# Patient Record
Sex: FEMALE | Race: OTHER | NOT HISPANIC OR LATINO
[De-identification: names, ages, dates, MRNs, and addresses within clinical notes are randomized per-mention and may not be internally consistent; named-entity substitution may affect disease eponyms.]

---

## 2019-01-16 PROBLEM — Z00.00 ENCOUNTER FOR PREVENTIVE HEALTH EXAMINATION: Status: ACTIVE | Noted: 2019-01-16

## 2019-01-17 ENCOUNTER — OTHER (OUTPATIENT)
Age: 64
End: 2019-01-17

## 2019-01-17 DIAGNOSIS — M25.559 PAIN IN UNSPECIFIED HIP: ICD-10-CM

## 2019-01-28 ENCOUNTER — APPOINTMENT (OUTPATIENT)
Dept: ORTHOPEDIC SURGERY | Facility: CLINIC | Age: 64
End: 2019-01-28
Payer: COMMERCIAL

## 2019-01-28 VITALS
HEART RATE: 66 BPM | HEIGHT: 65 IN | DIASTOLIC BLOOD PRESSURE: 92 MMHG | WEIGHT: 140 LBS | BODY MASS INDEX: 23.32 KG/M2 | SYSTOLIC BLOOD PRESSURE: 151 MMHG

## 2019-01-28 DIAGNOSIS — Z87.09 PERSONAL HISTORY OF OTHER DISEASES OF THE RESPIRATORY SYSTEM: ICD-10-CM

## 2019-01-28 DIAGNOSIS — Z78.9 OTHER SPECIFIED HEALTH STATUS: ICD-10-CM

## 2019-01-28 DIAGNOSIS — Z86.39 PERSONAL HISTORY OF OTHER ENDOCRINE, NUTRITIONAL AND METABOLIC DISEASE: ICD-10-CM

## 2019-01-28 DIAGNOSIS — Z82.61 FAMILY HISTORY OF ARTHRITIS: ICD-10-CM

## 2019-01-28 DIAGNOSIS — Z87.39 PERSONAL HISTORY OF OTHER DISEASES OF THE MUSCULOSKELETAL SYSTEM AND CONNECTIVE TISSUE: ICD-10-CM

## 2019-01-28 PROCEDURE — 73502 X-RAY EXAM HIP UNI 2-3 VIEWS: CPT | Mod: LT

## 2019-01-28 PROCEDURE — 99205 OFFICE O/P NEW HI 60 MIN: CPT

## 2019-01-28 RX ORDER — CELECOXIB 200 MG/1
200 CAPSULE ORAL
Refills: 0 | Status: ACTIVE | COMMUNITY

## 2019-01-28 RX ORDER — BRIMONIDINE TARTRATE 2 MG/MG
0.2 SOLUTION/ DROPS OPHTHALMIC
Refills: 0 | Status: ACTIVE | COMMUNITY

## 2019-01-28 RX ORDER — CHOLECALCIFEROL (VITAMIN D3) 125 MCG
TABLET ORAL
Refills: 0 | Status: ACTIVE | COMMUNITY

## 2019-01-28 RX ORDER — BRINZOLAMIDE 10 MG/ML
1 SUSPENSION/ DROPS OPHTHALMIC
Refills: 0 | Status: ACTIVE | COMMUNITY

## 2019-01-28 RX ORDER — SIMVASTATIN 10 MG/1
10 TABLET, FILM COATED ORAL
Refills: 0 | Status: ACTIVE | COMMUNITY

## 2019-01-28 RX ORDER — DICLOFENAC SODIUM 10 MG/G
1 GEL TOPICAL
Refills: 0 | Status: ACTIVE | COMMUNITY

## 2019-01-28 RX ORDER — UBIDECARENONE/VIT E ACET 100MG-5
CAPSULE ORAL
Refills: 0 | Status: ACTIVE | COMMUNITY

## 2019-01-28 RX ORDER — MULTIVIT-MIN/FA/LYCOPEN/LUTEIN .4-300-25
TABLET ORAL
Refills: 0 | Status: ACTIVE | COMMUNITY

## 2019-01-28 NOTE — HISTORY OF PRESENT ILLNESS
[de-identified] : The patient is a 51 year old female being seen for evaluation of her left hip. She reports progressive left hip pain over the past year. Pain is most notable in the groin. She reports constant discomfort. She has difficulty donning/doffing shoes and socks. She is unable to bend. She has decreased range of motion. She takes Celebrex as needed. She utilizes diclofenac gel. She has had an intra-articular cortisone injection in October of last year with only transient relief. She presents today for evaluation to discuss further treatment options. She does report an allergy to aspirin which develops a rash. She denies a history of diabetes, PE or DVT.

## 2019-01-28 NOTE — REVIEW OF SYSTEMS
[Joint Pain] : joint pain [Sight Problems] : vision problems [Urinary Frequency] : urinary frequency

## 2019-01-28 NOTE — DISCUSSION/SUMMARY
[de-identified] : The patient is a 51 year old female with bone on bone arthritis of the left hip. Based upon the patients continued symptoms and failure to respond to conservative treatment I have recommended a left total hip replacement for the patient. A discussion was had with the patient regarding a left total hip replacement. Anterior and posterior exposures were discussed. The patient would like to proceed with an anterior approach. A long discussion was had with the patient as what the total joint replacement would entail. A model was used to demonstrate the operation and to discuss bearing surfaces of the implants. The hospitalization and rehabilitation were discussed. The use of perioperative antibiotics and DVT prophylaxis were discussed. The risks, benefits and alternatives to surgical intervention were discussed at length with the patient. Specific risks discussed included: infection, wound breakdown, numbness and damage to nerves, tendon, muscle, arteries or other blood vessels, and the possibility of leg length discrepancy. The possibility of recurrent pain, no improvement in pain and actual worsening of the pain were also mentioned in conversation with the patient. Medical complications related to the patient's general medical health including deep vein thrombosis, pulmonary embolus, heart attack, stroke, death and other complications from anesthesia were discussed as well. The patient was told that we will take steps to minimize these risks by using sterile technique, antibiotics and DVT prophylaxis when appropriate and following the patient postoperatively in the clinic setting to monitor progress. The benefits of surgery were discussed with the patient including the potential to improve the current clinical condition through operative intervention. Alternatives to surgical intervention include continued conservative management which may yield less than optimal results in this particular patient. All questions were answered to the satisfaction of the patient.\par

## 2019-01-28 NOTE — PHYSICAL EXAM
[de-identified] : The patient appears well nourished  and in no apparent distress.  The patient is alert and oriented to person, place, and time.   Affect and mood appear normal. The head is normocephalic and atraumatic.  The eyes reveal normal sclera and extra ocular muscles are intact. The tongue is midline with no apparent lesions.  Skin shows normal turgor with no evidence of eczema or psoriasis.  No respiratory distress noted.  Sensation grossly intact.\par   [de-identified] : Exam of the left hip shows full hip extension, hip flexion of 80 degrees with pain and stiffness, external rotation of 20 degrees with pain, internal rotation of 5 degrees with pain. 5/5 motor strength bilaterally distally. Sensation intact distally.  [de-identified] : Xray- AP pelvis and 2 views left hip shows bone on bone arthritis of the left hip with large femoral subchondral cysts.

## 2019-01-28 NOTE — ADDENDUM
[FreeTextEntry1] : This note was authored by Ezekiel Gonzalez working as a medical scribe for Dr. Endy Young. The note was reviewed, edited, and revised by Dr. Endy Young whom is in agreement with the exam findings, imaging findings, and treatment plan. 01/28/2019.

## 2019-01-28 NOTE — CONSULT LETTER
[Dear  ___] : Dear  [unfilled], [Consult Letter:] : I had the pleasure of evaluating your patient, [unfilled]. [Please see my note below.] : Please see my note below. [Consult Closing:] : Thank you very much for allowing me to participate in the care of this patient.  If you have any questions, please do not hesitate to contact me. [Sincerely,] : Sincerely, [FreeTextEntry2] : Delfin Perera MD [FreeTextEntry3] : Endy Young MD\par Chief of Joint Replacement\par [facility] \par [facility]\par \par

## 2019-03-04 ENCOUNTER — OTHER (OUTPATIENT)
Age: 64
End: 2019-03-04

## 2019-03-05 ENCOUNTER — APPOINTMENT (OUTPATIENT)
Dept: ORTHOPEDIC SURGERY | Facility: CLINIC | Age: 64
End: 2019-03-05
Payer: MEDICARE

## 2019-03-05 VITALS
SYSTOLIC BLOOD PRESSURE: 132 MMHG | HEART RATE: 68 BPM | DIASTOLIC BLOOD PRESSURE: 83 MMHG | HEIGHT: 65 IN | BODY MASS INDEX: 23.32 KG/M2 | WEIGHT: 140 LBS

## 2019-03-05 DIAGNOSIS — M16.12 UNILATERAL PRIMARY OSTEOARTHRITIS, LEFT HIP: ICD-10-CM

## 2019-03-05 PROCEDURE — 73502 X-RAY EXAM HIP UNI 2-3 VIEWS: CPT

## 2019-03-05 PROCEDURE — 99214 OFFICE O/P EST MOD 30 MIN: CPT

## 2019-03-05 NOTE — DISCUSSION/SUMMARY
[de-identified] : The patient is a 63 year old female with bone on bone arthritis of the left hip. Patient is scheduled for a left anterior THR March 29.  A discussion was had with the patient regarding a left total hip replacement. A long discussion was had with the patient as what the total joint replacement would entail. A model was used to demonstrate the operation and to discuss bearing surfaces of the implants. The hospitalization and rehabilitation were discussed. The use of perioperative antibiotics and DVT prophylaxis were discussed. The risks, benefits and alternatives to surgical intervention were discussed at length with the patient. Specific risks discussed included: infection, wound breakdown, numbness and damage to nerves, tendon, muscle, arteries or other blood vessels, and the possibility of leg length discrepancy. The possibility of recurrent pain, no improvement in pain and actual worsening of the pain were also mentioned in conversation with the patient. Medical complications related to the patient's general medical health including deep vein thrombosis, pulmonary embolus, heart attack, stroke, death and other complications from anesthesia were discussed as well. The patient was told that we will take steps to minimize these risks by using sterile technique, antibiotics and DVT prophylaxis when appropriate and following the patient postoperatively in the clinic setting to monitor progress. The benefits of surgery were discussed with the patient including the potential to improve the current clinical condition through operative intervention. Alternatives to surgical intervention include continued conservative management which may yield less than optimal results in this particular patient. All questions were answered to the satisfaction of the patient.\par

## 2019-03-05 NOTE — HISTORY OF PRESENT ILLNESS
[de-identified] : The patient is a 63 year old female being seen for evaluation of her left hip.She has advanced osteoarthritis of the left hip. She reports persistent symptoms. She is tentatively scheduled to undergo a left anterior approach THR on March 29. She presents today for reevaluation and discussion of the pre-, intra-and postoperative course.

## 2019-03-05 NOTE — ADDENDUM
[FreeTextEntry1] : This note was authored by Ezekiel Gonzalez working as a medical scribe for Dr. Endy Young. The note was reviewed, edited, and revised by Dr. Endy Young whom is in agreement with the exam findings, imaging findings, and treatment plan. 03/05/2019.

## 2019-03-05 NOTE — PHYSICAL EXAM
[de-identified] : The patient appears well nourished  and in no apparent distress.  The patient is alert and oriented to person, place, and time.   Affect and mood appear normal. The head is normocephalic and atraumatic.  The eyes reveal normal sclera and extra ocular muscles are intact. The tongue is midline with no apparent lesions.  Skin shows normal turgor with no evidence of eczema or psoriasis.  No respiratory distress noted.  Sensation grossly intact.\par   [de-identified] : Exam of the left hip shows full hip extension, hip flexion of 80 degrees with pain and stiffness, external rotation of 20 degrees with pain, internal rotation of 5 degrees with pain. 5/5 motor strength bilaterally distally. Sensation intact distally.  [de-identified] : Xray- AP pelvis and 2 views left hip shows bone on bone arthritis of the left hip with large femoral head subchondral cysts.

## 2019-03-11 ENCOUNTER — OUTPATIENT (OUTPATIENT)
Dept: OUTPATIENT SERVICES | Facility: HOSPITAL | Age: 64
LOS: 1 days | End: 2019-03-11
Payer: MEDICARE

## 2019-03-11 VITALS
TEMPERATURE: 97 F | HEIGHT: 63 IN | DIASTOLIC BLOOD PRESSURE: 77 MMHG | SYSTOLIC BLOOD PRESSURE: 116 MMHG | HEART RATE: 66 BPM | RESPIRATION RATE: 16 BRPM | WEIGHT: 147.71 LBS

## 2019-03-11 DIAGNOSIS — Z13.89 ENCOUNTER FOR SCREENING FOR OTHER DISORDER: ICD-10-CM

## 2019-03-11 DIAGNOSIS — Z01.818 ENCOUNTER FOR OTHER PREPROCEDURAL EXAMINATION: ICD-10-CM

## 2019-03-11 DIAGNOSIS — Z98.890 OTHER SPECIFIED POSTPROCEDURAL STATES: Chronic | ICD-10-CM

## 2019-03-11 DIAGNOSIS — Z29.9 ENCOUNTER FOR PROPHYLACTIC MEASURES, UNSPECIFIED: ICD-10-CM

## 2019-03-11 DIAGNOSIS — M16.12 UNILATERAL PRIMARY OSTEOARTHRITIS, LEFT HIP: ICD-10-CM

## 2019-03-11 LAB
ANION GAP SERPL CALC-SCNC: 11 MMOL/L — SIGNIFICANT CHANGE UP (ref 5–17)
APTT BLD: 32 SEC — SIGNIFICANT CHANGE UP (ref 27.5–36.3)
BLD GP AB SCN SERPL QL: SIGNIFICANT CHANGE UP
BUN SERPL-MCNC: 12 MG/DL — SIGNIFICANT CHANGE UP (ref 8–20)
CALCIUM SERPL-MCNC: 9.2 MG/DL — SIGNIFICANT CHANGE UP (ref 8.6–10.2)
CHLORIDE SERPL-SCNC: 103 MMOL/L — SIGNIFICANT CHANGE UP (ref 98–107)
CO2 SERPL-SCNC: 27 MMOL/L — SIGNIFICANT CHANGE UP (ref 22–29)
CREAT SERPL-MCNC: 0.68 MG/DL — SIGNIFICANT CHANGE UP (ref 0.5–1.3)
GLUCOSE SERPL-MCNC: 107 MG/DL — SIGNIFICANT CHANGE UP (ref 70–115)
HBA1C BLD-MCNC: 5.8 % — HIGH (ref 4–5.6)
HCT VFR BLD CALC: 41.4 % — SIGNIFICANT CHANGE UP (ref 37–47)
HGB BLD-MCNC: 13.9 G/DL — SIGNIFICANT CHANGE UP (ref 12–16)
INR BLD: 0.93 RATIO — SIGNIFICANT CHANGE UP (ref 0.88–1.16)
MCHC RBC-ENTMCNC: 31 PG — SIGNIFICANT CHANGE UP (ref 27–31)
MCHC RBC-ENTMCNC: 33.6 G/DL — SIGNIFICANT CHANGE UP (ref 32–36)
MCV RBC AUTO: 92.2 FL — SIGNIFICANT CHANGE UP (ref 81–99)
MRSA PCR RESULT.: SIGNIFICANT CHANGE UP
PLATELET # BLD AUTO: 259 K/UL — SIGNIFICANT CHANGE UP (ref 150–400)
POTASSIUM SERPL-MCNC: 4.3 MMOL/L — SIGNIFICANT CHANGE UP (ref 3.5–5.3)
POTASSIUM SERPL-SCNC: 4.3 MMOL/L — SIGNIFICANT CHANGE UP (ref 3.5–5.3)
PROTHROM AB SERPL-ACNC: 10.7 SEC — SIGNIFICANT CHANGE UP (ref 10–12.9)
RBC # BLD: 4.49 M/UL — SIGNIFICANT CHANGE UP (ref 4.4–5.2)
RBC # FLD: 12.6 % — SIGNIFICANT CHANGE UP (ref 11–15.6)
S AUREUS DNA NOSE QL NAA+PROBE: SIGNIFICANT CHANGE UP
SODIUM SERPL-SCNC: 141 MMOL/L — SIGNIFICANT CHANGE UP (ref 135–145)
TYPE + AB SCN PNL BLD: SIGNIFICANT CHANGE UP
WBC # BLD: 6.5 K/UL — SIGNIFICANT CHANGE UP (ref 4.8–10.8)
WBC # FLD AUTO: 6.5 K/UL — SIGNIFICANT CHANGE UP (ref 4.8–10.8)

## 2019-03-11 PROCEDURE — 86900 BLOOD TYPING SEROLOGIC ABO: CPT

## 2019-03-11 PROCEDURE — 87640 STAPH A DNA AMP PROBE: CPT

## 2019-03-11 PROCEDURE — 83036 HEMOGLOBIN GLYCOSYLATED A1C: CPT

## 2019-03-11 PROCEDURE — 86901 BLOOD TYPING SEROLOGIC RH(D): CPT

## 2019-03-11 PROCEDURE — 87641 MR-STAPH DNA AMP PROBE: CPT

## 2019-03-11 PROCEDURE — 36415 COLL VENOUS BLD VENIPUNCTURE: CPT

## 2019-03-11 PROCEDURE — 85730 THROMBOPLASTIN TIME PARTIAL: CPT

## 2019-03-11 PROCEDURE — 85610 PROTHROMBIN TIME: CPT

## 2019-03-11 PROCEDURE — 80048 BASIC METABOLIC PNL TOTAL CA: CPT

## 2019-03-11 PROCEDURE — 93005 ELECTROCARDIOGRAM TRACING: CPT

## 2019-03-11 PROCEDURE — 85027 COMPLETE CBC AUTOMATED: CPT

## 2019-03-11 PROCEDURE — 93010 ELECTROCARDIOGRAM REPORT: CPT

## 2019-03-11 PROCEDURE — 86850 RBC ANTIBODY SCREEN: CPT

## 2019-03-11 NOTE — H&P PST ADULT - HISTORY OF PRESENT ILLNESS
This is a 63 y.o female who presents to PST today. This is a 63 y.o female who presents to PST today.  The pt reports a chronic history of left hip pain for which she has undergone PT with initial improvement, however, the pain continues to persist.   She is scheduled for a left hip replacement in the near future.

## 2019-03-11 NOTE — H&P PST ADULT - RS GEN PE MLT RESP DETAILS PC
diminished breath sounds, L/diminished breath sounds, R/respirations non-labored/normal/airway patent

## 2019-03-11 NOTE — H&P PST ADULT - ASSESSMENT
CAPRINI VTE 2.0 SCORE [CLOT updated 2019]    AGE RELATED RISK FACTORS                                                       MOBILITY RELATED FACTORS  [ ] Age 41-60 years                                            (1 Point)                    [ ] Bed rest                                                        (1 Point)  [ ] Age: 61-74 years                                           (2 Points)                  [ ] Plaster cast                                                   (2 Points)  [ ] Age= 75 years                                              (3 Points)                    [ ] Bed bound for more than 72 hours                 (2 Points)    DISEASE RELATED RISK FACTORS                                               GENDER SPECIFIC FACTORS  [ ] Edema in the lower extremities                       (1 Point)              [ ] Pregnancy                                                     (1 Point)  [ ] Varicose veins                                               (1 Point)                     [ ] Post-partum < 6 weeks                                   (1 Point)             [ ] BMI > 25 Kg/m2                                            (1 Point)                     [ ] Hormonal therapy  or oral contraception          (1 Point)                 [ ] Sepsis (in the previous month)                        (1 Point)               [ ] History of pregnancy complications                 (1 point)  [ ] Pneumonia or serious lung disease                                               [ ] Unexplained or recurrent                     (1 Point)           (in the previous month)                               (1 Point)  [ ] Abnormal pulmonary function test                     (1 Point)                 SURGERY RELATED RISK FACTORS  [ ] Acute myocardial infarction                              (1 Point)               [ ]  Section                                             (1 Point)  [ ] Congestive heart failure (in the previous month)  (1 Point)      [ ] Minor surgery                                                  (1 Point)   [ ] Inflammatory bowel disease                             (1 Point)               [ ] Arthroscopic surgery                                        (2 Points)  [ ] Central venous access                                      (2 Points)                [ ] General surgery lasting more than 45 minutes (2 points)  [ ] Malignancy- Present or previous                   (2 Points)                [ ] Elective arthroplasty                                         (5 points)    [ ] Stroke (in the previous month)                          (5 Points)                                                                                                                                                           HEMATOLOGY RELATED FACTORS                                                 TRAUMA RELATED RISK FACTORS  [ ] Prior episodes of VTE                                     (3 Points)                [ ] Fracture of the hip, pelvis, or leg                       (5 Points)  [ ] Positive family history for VTE                         (3 Points)             [ ] Acute spinal cord injury (in the previous month)  (5 Points)  [ ] Prothrombin 41601 A                                     (3 Points)               [ ] Paralysis  (less than 1 month)                             (5 Points)  [ ] Factor V Leiden                                             (3 Points)                  [ ] Multiple Trauma within 1 month                        (5 Points)  [ ] Lupus anticoagulants                                     (3 Points)                                                           [ ] Anticardiolipin antibodies                               (3 Points)                                                       [ ] High homocysteine in the blood                      (3 Points)                                             [ ] Other congenital or acquired thrombophilia      (3 Points)                                                [ ] Heparin induced thrombocytopenia                  (3 Points)                                     Total Score [          ]    OPIOID RISK TOOL    GIOVANNI EACH BOX THAT APPLIES AND ADD TOTALS AT THE END    FAMILY HISTORY OF SUBSTANCE ABUSE                 FEMALE         MALE                                                Alcohol                            [    ] 1 pt         [     ] 3pts                                               Illegal Drugs                    [    ] 2 pts       [     ] 3pts                                               Rx Drugs                          [      ]4 pts      [     ] 4 pts    PERSONAL HISTORY OF SUBSTANCE ABUSE                                                                                          Alcohol                            [     ] 3 pts       [     ] 3 pts                                               Illegal Drugs                    [     ] 4 pts       [     ] 4 pts                                               Rx Drugs                          [     ] 5 pts       [     ] 5 pts    AGE BETWEEN 16-45 YEARS                                     [     ] 1 pt         [     ] 1 pt    HISTORY OF PREADOLESCENT   SEXUAL ABUSE                                                            [     ] 3 pts        [     ] 0pts    PSYCHOLOGICAL DISEASE                     ADD, OCD, Bipolar, Schizophrenia        [     ] 2 pts        [     ] 2 pts                      Depression                                               [     ] 1 pt          [     ] 1 pt           SCORING TOTAL   (add numbers and type here)              (      )                                     A score of 3 or lower indicated LOW risk for future opioid abuse  A score of 4 to 7 indicated moderate risk for future opioid abuse  A score of 8 or higher indicates a high risk for opioid abuse CAPRINI VTE 2.0 SCORE [CLOT updated 2019]    AGE RELATED RISK FACTORS                                                       MOBILITY RELATED FACTORS  [ ] Age 41-60 years                                            (1 Point)                    [ ] Bed rest                                                        (1 Point)  [x ] Age: 61-74 years                                           (2 Points)                  [ ] Plaster cast                                                   (2 Points)  [ ] Age= 75 years                                              (3 Points)                    [ ] Bed bound for more than 72 hours                 (2 Points)    DISEASE RELATED RISK FACTORS                                               GENDER SPECIFIC FACTORS  [ ] Edema in the lower extremities                       (1 Point)              [ ] Pregnancy                                                     (1 Point)  [ ] Varicose veins                                               (1 Point)                     [ ] Post-partum < 6 weeks                                   (1 Point)             [x ] BMI > 25 Kg/m2                                            (1 Point)                     [ ] Hormonal therapy  or oral contraception          (1 Point)                 [ ] Sepsis (in the previous month)                        (1 Point)               [ ] History of pregnancy complications                 (1 point)  [ ] Pneumonia or serious lung disease                                               [ ] Unexplained or recurrent                     (1 Point)           (in the previous month)                               (1 Point)  [ ] Abnormal pulmonary function test                     (1 Point)                 SURGERY RELATED RISK FACTORS  [ ] Acute myocardial infarction                              (1 Point)               [ ]  Section                                             (1 Point)  [ ] Congestive heart failure (in the previous month)  (1 Point)      [ ] Minor surgery                                                  (1 Point)   [ ] Inflammatory bowel disease                             (1 Point)               [ ] Arthroscopic surgery                                        (2 Points)  [ ] Central venous access                                      (2 Points)                [ ] General surgery lasting more than 45 minutes (2 points)  [ ] Malignancy- Present or previous                   (2 Points)                [x ] Elective arthroplasty                                         (5 points)    [ ] Stroke (in the previous month)                          (5 Points)                                                                                                                                                           HEMATOLOGY RELATED FACTORS                                                 TRAUMA RELATED RISK FACTORS  [ ] Prior episodes of VTE                                     (3 Points)                [ ] Fracture of the hip, pelvis, or leg                       (5 Points)  [ ] Positive family history for VTE                         (3 Points)             [ ] Acute spinal cord injury (in the previous month)  (5 Points)  [ ] Prothrombin 28725 A                                     (3 Points)               [ ] Paralysis  (less than 1 month)                             (5 Points)  [ ] Factor V Leiden                                             (3 Points)                  [ ] Multiple Trauma within 1 month                        (5 Points)  [ ] Lupus anticoagulants                                     (3 Points)                                                           [ ] Anticardiolipin antibodies                               (3 Points)                                                       [ ] High homocysteine in the blood                      (3 Points)                                             [ ] Other congenital or acquired thrombophilia      (3 Points)                                                [ ] Heparin induced thrombocytopenia                  (3 Points)                                     Total Score [    8      ]    OPIOID RISK TOOL    GIOVANNI EACH BOX THAT APPLIES AND ADD TOTALS AT THE END    FAMILY HISTORY OF SUBSTANCE ABUSE                 FEMALE         MALE                                                Alcohol                            [    ] 1 pt         [     ] 3pts                                               Illegal Drugs                    [    ] 2 pts       [     ] 3pts                                               Rx Drugs                          [      ]4 pts      [     ] 4 pts    PERSONAL HISTORY OF SUBSTANCE ABUSE                                                                                          Alcohol                            [     ] 3 pts       [     ] 3 pts                                               Illegal Drugs                    [     ] 4 pts       [     ] 4 pts                                               Rx Drugs                          [     ] 5 pts       [     ] 5 pts    AGE BETWEEN 16-45 YEARS                                     [     ] 1 pt         [     ] 1 pt    HISTORY OF PREADOLESCENT   SEXUAL ABUSE                                                            [     ] 3 pts        [     ] 0pts    PSYCHOLOGICAL DISEASE                     ADD, OCD, Bipolar, Schizophrenia        [     ] 2 pts        [     ] 2 pts                      Depression                                               [     ] 1 pt          [     ] 1 pt           SCORING TOTAL   (add numbers and type here)              (  0    )                                     A score of 3 or lower indicated LOW risk for future opioid abuse  A score of 4 to 7 indicated moderate risk for future opioid abuse  A score of 8 or higher indicates a high risk for opioid abuse

## 2019-03-11 NOTE — H&P PST ADULT - NSANTHOSAYNRD_GEN_A_CORE
No. ROGERIO screening performed.  STOP BANG Legend: 0-2 = LOW Risk; 3-4 = INTERMEDIATE Risk; 5-8 = HIGH Risk

## 2019-03-11 NOTE — H&P PST ADULT - EKG AND INTERPRETATION
EKG demonstrates Sinus Bradycardia at 59bpm with PAC's noted.  PRWP V1-V3.  Pending official reading

## 2019-03-11 NOTE — H&P PST ADULT - FAMILY HISTORY
Father  Still living? No  Family history of CHF (congestive heart failure), Age at diagnosis: Age Unknown

## 2019-03-11 NOTE — PATIENT PROFILE ADULT - NSPROEDALEARNPREF_GEN_A_NUR
verbal instruction/written material/presurgical, surgical scrub instructions, pain management education given - verbalized understanding

## 2019-03-28 ENCOUNTER — TRANSCRIPTION ENCOUNTER (OUTPATIENT)
Age: 64
End: 2019-03-28

## 2019-03-29 ENCOUNTER — TRANSCRIPTION ENCOUNTER (OUTPATIENT)
Age: 64
End: 2019-03-29

## 2019-03-29 ENCOUNTER — INPATIENT (INPATIENT)
Facility: HOSPITAL | Age: 64
LOS: 2 days | Discharge: HOME | DRG: 470 | End: 2019-04-01
Attending: ORTHOPAEDIC SURGERY | Admitting: ORTHOPAEDIC SURGERY
Payer: MEDICARE

## 2019-03-29 ENCOUNTER — APPOINTMENT (OUTPATIENT)
Dept: ORTHOPEDIC SURGERY | Facility: HOSPITAL | Age: 64
End: 2019-03-29

## 2019-03-29 VITALS
HEIGHT: 65 IN | DIASTOLIC BLOOD PRESSURE: 77 MMHG | WEIGHT: 139.99 LBS | HEART RATE: 74 BPM | SYSTOLIC BLOOD PRESSURE: 118 MMHG | RESPIRATION RATE: 16 BRPM | OXYGEN SATURATION: 100 % | TEMPERATURE: 98 F

## 2019-03-29 DIAGNOSIS — M16.12 UNILATERAL PRIMARY OSTEOARTHRITIS, LEFT HIP: ICD-10-CM

## 2019-03-29 DIAGNOSIS — Z98.890 OTHER SPECIFIED POSTPROCEDURAL STATES: Chronic | ICD-10-CM

## 2019-03-29 DIAGNOSIS — E78.5 HYPERLIPIDEMIA, UNSPECIFIED: ICD-10-CM

## 2019-03-29 DIAGNOSIS — H40.9 UNSPECIFIED GLAUCOMA: ICD-10-CM

## 2019-03-29 LAB
ABO RH CONFIRMATION: SIGNIFICANT CHANGE UP
GLUCOSE BLDC GLUCOMTR-MCNC: 102 MG/DL — HIGH (ref 70–99)
GLUCOSE BLDC GLUCOMTR-MCNC: 104 MG/DL — HIGH (ref 70–99)
GLUCOSE BLDC GLUCOMTR-MCNC: 110 MG/DL — HIGH (ref 70–99)

## 2019-03-29 PROCEDURE — 27130 TOTAL HIP ARTHROPLASTY: CPT | Mod: AS,LT

## 2019-03-29 PROCEDURE — 99222 1ST HOSP IP/OBS MODERATE 55: CPT

## 2019-03-29 PROCEDURE — 12345: CPT | Mod: NC

## 2019-03-29 PROCEDURE — 93010 ELECTROCARDIOGRAM REPORT: CPT

## 2019-03-29 PROCEDURE — 27130 TOTAL HIP ARTHROPLASTY: CPT | Mod: LT

## 2019-03-29 PROCEDURE — 73502 X-RAY EXAM HIP UNI 2-3 VIEWS: CPT | Mod: 26,LT

## 2019-03-29 RX ORDER — ONDANSETRON 8 MG/1
4 TABLET, FILM COATED ORAL ONCE
Qty: 0 | Refills: 0 | Status: DISCONTINUED | OUTPATIENT
Start: 2019-03-29 | End: 2019-03-29

## 2019-03-29 RX ORDER — ONDANSETRON 8 MG/1
4 TABLET, FILM COATED ORAL EVERY 6 HOURS
Qty: 0 | Refills: 0 | Status: DISCONTINUED | OUTPATIENT
Start: 2019-03-29 | End: 2019-04-01

## 2019-03-29 RX ORDER — CELECOXIB 200 MG/1
400 CAPSULE ORAL ONCE
Qty: 0 | Refills: 0 | Status: COMPLETED | OUTPATIENT
Start: 2019-03-29 | End: 2019-03-29

## 2019-03-29 RX ORDER — VANCOMYCIN HCL 1 G
1000 VIAL (EA) INTRAVENOUS
Qty: 0 | Refills: 0 | Status: COMPLETED | OUTPATIENT
Start: 2019-03-29 | End: 2019-03-29

## 2019-03-29 RX ORDER — ACETAMINOPHEN 500 MG
1000 TABLET ORAL
Qty: 0 | Refills: 0 | Status: COMPLETED | OUTPATIENT
Start: 2019-03-29 | End: 2019-03-29

## 2019-03-29 RX ORDER — SODIUM CHLORIDE 9 MG/ML
3 INJECTION INTRAMUSCULAR; INTRAVENOUS; SUBCUTANEOUS EVERY 8 HOURS
Qty: 0 | Refills: 0 | Status: DISCONTINUED | OUTPATIENT
Start: 2019-03-29 | End: 2019-03-29

## 2019-03-29 RX ORDER — OXYCODONE HYDROCHLORIDE 5 MG/1
5 TABLET ORAL
Qty: 0 | Refills: 0 | Status: DISCONTINUED | OUTPATIENT
Start: 2019-03-29 | End: 2019-04-01

## 2019-03-29 RX ORDER — CEFAZOLIN SODIUM 1 G
2000 VIAL (EA) INJECTION ONCE
Qty: 0 | Refills: 0 | Status: DISCONTINUED | OUTPATIENT
Start: 2019-03-29 | End: 2019-03-29

## 2019-03-29 RX ORDER — ACETAMINOPHEN 500 MG
975 TABLET ORAL EVERY 8 HOURS
Qty: 0 | Refills: 0 | Status: DISCONTINUED | OUTPATIENT
Start: 2019-03-29 | End: 2019-04-01

## 2019-03-29 RX ORDER — POLYETHYLENE GLYCOL 3350 17 G/17G
17 POWDER, FOR SOLUTION ORAL DAILY
Qty: 0 | Refills: 0 | Status: DISCONTINUED | OUTPATIENT
Start: 2019-03-29 | End: 2019-04-01

## 2019-03-29 RX ORDER — SENNA PLUS 8.6 MG/1
2 TABLET ORAL AT BEDTIME
Qty: 0 | Refills: 0 | Status: DISCONTINUED | OUTPATIENT
Start: 2019-03-29 | End: 2019-04-01

## 2019-03-29 RX ORDER — SCOPALAMINE 1 MG/3D
1 PATCH, EXTENDED RELEASE TRANSDERMAL ONCE
Qty: 0 | Refills: 0 | Status: COMPLETED | OUTPATIENT
Start: 2019-03-29 | End: 2019-03-29

## 2019-03-29 RX ORDER — DOCUSATE SODIUM 100 MG
100 CAPSULE ORAL THREE TIMES A DAY
Qty: 0 | Refills: 0 | Status: DISCONTINUED | OUTPATIENT
Start: 2019-03-29 | End: 2019-04-01

## 2019-03-29 RX ORDER — ACETAMINOPHEN 500 MG
1000 TABLET ORAL ONCE
Qty: 0 | Refills: 0 | Status: DISCONTINUED | OUTPATIENT
Start: 2019-03-29 | End: 2019-03-29

## 2019-03-29 RX ORDER — FENTANYL CITRATE 50 UG/ML
50 INJECTION INTRAVENOUS
Qty: 0 | Refills: 0 | Status: DISCONTINUED | OUTPATIENT
Start: 2019-03-29 | End: 2019-03-29

## 2019-03-29 RX ORDER — KETOROLAC TROMETHAMINE 30 MG/ML
30 SYRINGE (ML) INJECTION EVERY 8 HOURS
Qty: 0 | Refills: 0 | Status: DISCONTINUED | OUTPATIENT
Start: 2019-03-29 | End: 2019-04-01

## 2019-03-29 RX ORDER — SODIUM CHLORIDE 9 MG/ML
1000 INJECTION, SOLUTION INTRAVENOUS
Qty: 0 | Refills: 0 | Status: DISCONTINUED | OUTPATIENT
Start: 2019-03-29 | End: 2019-03-29

## 2019-03-29 RX ORDER — TRANEXAMIC ACID 100 MG/ML
650 INJECTION, SOLUTION INTRAVENOUS ONCE
Qty: 0 | Refills: 0 | Status: DISCONTINUED | OUTPATIENT
Start: 2019-03-29 | End: 2019-03-29

## 2019-03-29 RX ORDER — OXYCODONE HYDROCHLORIDE 5 MG/1
10 TABLET ORAL ONCE
Qty: 0 | Refills: 0 | Status: DISCONTINUED | OUTPATIENT
Start: 2019-03-29 | End: 2019-03-29

## 2019-03-29 RX ORDER — SIMVASTATIN 20 MG/1
10 TABLET, FILM COATED ORAL AT BEDTIME
Qty: 0 | Refills: 0 | Status: DISCONTINUED | OUTPATIENT
Start: 2019-03-29 | End: 2019-04-01

## 2019-03-29 RX ORDER — HYDROMORPHONE HYDROCHLORIDE 2 MG/ML
0.5 INJECTION INTRAMUSCULAR; INTRAVENOUS; SUBCUTANEOUS EVERY 4 HOURS
Qty: 0 | Refills: 0 | Status: DISCONTINUED | OUTPATIENT
Start: 2019-03-29 | End: 2019-04-01

## 2019-03-29 RX ORDER — VANCOMYCIN HCL 1 G
1000 VIAL (EA) INTRAVENOUS ONCE
Qty: 0 | Refills: 0 | Status: COMPLETED | OUTPATIENT
Start: 2019-03-29 | End: 2019-03-29

## 2019-03-29 RX ORDER — ENOXAPARIN SODIUM 100 MG/ML
40 INJECTION SUBCUTANEOUS EVERY 24 HOURS
Qty: 0 | Refills: 0 | Status: DISCONTINUED | OUTPATIENT
Start: 2019-03-30 | End: 2019-04-01

## 2019-03-29 RX ORDER — ERGOCALCIFEROL 1.25 MG/1
1 CAPSULE ORAL
Qty: 0 | Refills: 0 | COMMUNITY

## 2019-03-29 RX ORDER — DORZOLAMIDE HYDROCHLORIDE 20 MG/ML
2 SOLUTION/ DROPS OPHTHALMIC
Qty: 0 | Refills: 0 | Status: DISCONTINUED | OUTPATIENT
Start: 2019-03-30 | End: 2019-04-01

## 2019-03-29 RX ORDER — HYDROMORPHONE HYDROCHLORIDE 2 MG/ML
2 INJECTION INTRAMUSCULAR; INTRAVENOUS; SUBCUTANEOUS
Qty: 0 | Refills: 0 | Status: DISCONTINUED | OUTPATIENT
Start: 2019-03-29 | End: 2019-04-01

## 2019-03-29 RX ORDER — ERGOCALCIFEROL 1.25 MG/1
50000 CAPSULE ORAL
Qty: 0 | Refills: 0 | Status: DISCONTINUED | OUTPATIENT
Start: 2019-03-29 | End: 2019-04-01

## 2019-03-29 RX ORDER — CELECOXIB 200 MG/1
200 CAPSULE ORAL
Qty: 0 | Refills: 0 | Status: DISCONTINUED | OUTPATIENT
Start: 2019-03-30 | End: 2019-04-01

## 2019-03-29 RX ORDER — MULTIVIT-MIN/FERROUS GLUCONATE 9 MG/15 ML
1 LIQUID (ML) ORAL
Qty: 0 | Refills: 0 | COMMUNITY

## 2019-03-29 RX ORDER — KETOROLAC TROMETHAMINE 30 MG/ML
15 SYRINGE (ML) INJECTION EVERY 6 HOURS
Qty: 0 | Refills: 0 | Status: DISCONTINUED | OUTPATIENT
Start: 2019-03-29 | End: 2019-03-30

## 2019-03-29 RX ORDER — SODIUM CHLORIDE 9 MG/ML
1000 INJECTION, SOLUTION INTRAVENOUS
Qty: 0 | Refills: 0 | Status: DISCONTINUED | OUTPATIENT
Start: 2019-03-29 | End: 2019-04-01

## 2019-03-29 RX ORDER — GABAPENTIN 400 MG/1
300 CAPSULE ORAL ONCE
Qty: 0 | Refills: 0 | Status: COMPLETED | OUTPATIENT
Start: 2019-03-29 | End: 2019-03-29

## 2019-03-29 RX ORDER — UBIDECARENONE 100 MG
0 CAPSULE ORAL
Qty: 0 | Refills: 0 | COMMUNITY

## 2019-03-29 RX ORDER — MAGNESIUM HYDROXIDE 400 MG/1
30 TABLET, CHEWABLE ORAL AT BEDTIME
Qty: 0 | Refills: 0 | Status: DISCONTINUED | OUTPATIENT
Start: 2019-03-29 | End: 2019-04-01

## 2019-03-29 RX ORDER — ACETAMINOPHEN 500 MG
650 TABLET ORAL EVERY 6 HOURS
Qty: 0 | Refills: 0 | Status: DISCONTINUED | OUTPATIENT
Start: 2019-03-29 | End: 2019-04-01

## 2019-03-29 RX ORDER — SIMVASTATIN 20 MG/1
1 TABLET, FILM COATED ORAL
Qty: 0 | Refills: 0 | COMMUNITY

## 2019-03-29 RX ORDER — BRINZOLAMIDE 10 MG/ML
2 SUSPENSION/ DROPS OPHTHALMIC
Qty: 0 | Refills: 0 | COMMUNITY

## 2019-03-29 RX ORDER — OXYCODONE HYDROCHLORIDE 5 MG/1
10 TABLET ORAL EVERY 12 HOURS
Qty: 0 | Refills: 0 | Status: DISCONTINUED | OUTPATIENT
Start: 2019-03-29 | End: 2019-04-01

## 2019-03-29 RX ORDER — MULTIVIT-MIN/FERROUS GLUCONATE 9 MG/15 ML
1 LIQUID (ML) ORAL DAILY
Qty: 0 | Refills: 0 | Status: DISCONTINUED | OUTPATIENT
Start: 2019-03-29 | End: 2019-04-01

## 2019-03-29 RX ORDER — OXYCODONE HYDROCHLORIDE 5 MG/1
10 TABLET ORAL
Qty: 0 | Refills: 0 | Status: DISCONTINUED | OUTPATIENT
Start: 2019-03-29 | End: 2019-04-01

## 2019-03-29 RX ADMIN — OXYCODONE HYDROCHLORIDE 10 MILLIGRAM(S): 5 TABLET ORAL at 22:07

## 2019-03-29 RX ADMIN — SIMVASTATIN 10 MILLIGRAM(S): 20 TABLET, FILM COATED ORAL at 22:01

## 2019-03-29 RX ADMIN — Medication 975 MILLIGRAM(S): at 22:07

## 2019-03-29 RX ADMIN — OXYCODONE HYDROCHLORIDE 10 MILLIGRAM(S): 5 TABLET ORAL at 07:40

## 2019-03-29 RX ADMIN — Medication 100 MILLIGRAM(S): at 15:02

## 2019-03-29 RX ADMIN — Medication 400 MILLIGRAM(S): at 15:57

## 2019-03-29 RX ADMIN — CELECOXIB 400 MILLIGRAM(S): 200 CAPSULE ORAL at 07:40

## 2019-03-29 RX ADMIN — OXYCODONE HYDROCHLORIDE 10 MILLIGRAM(S): 5 TABLET ORAL at 22:00

## 2019-03-29 RX ADMIN — Medication 1000 MILLIGRAM(S): at 17:00

## 2019-03-29 RX ADMIN — Medication 250 MILLIGRAM(S): at 22:01

## 2019-03-29 RX ADMIN — Medication 15 MILLIGRAM(S): at 23:36

## 2019-03-29 RX ADMIN — OXYCODONE HYDROCHLORIDE 10 MILLIGRAM(S): 5 TABLET ORAL at 07:50

## 2019-03-29 RX ADMIN — SCOPALAMINE 1 PATCH: 1 PATCH, EXTENDED RELEASE TRANSDERMAL at 19:35

## 2019-03-29 RX ADMIN — Medication 100 MILLIGRAM(S): at 22:01

## 2019-03-29 RX ADMIN — CELECOXIB 400 MILLIGRAM(S): 200 CAPSULE ORAL at 07:50

## 2019-03-29 RX ADMIN — Medication 15 MILLIGRAM(S): at 23:38

## 2019-03-29 RX ADMIN — Medication 975 MILLIGRAM(S): at 22:01

## 2019-03-29 RX ADMIN — Medication 250 MILLIGRAM(S): at 07:39

## 2019-03-29 RX ADMIN — Medication 100 MILLIGRAM(S): at 23:35

## 2019-03-29 RX ADMIN — SODIUM CHLORIDE 150 MILLILITER(S): 9 INJECTION, SOLUTION INTRAVENOUS at 22:02

## 2019-03-29 RX ADMIN — SCOPALAMINE 1 PATCH: 1 PATCH, EXTENDED RELEASE TRANSDERMAL at 07:40

## 2019-03-29 RX ADMIN — GABAPENTIN 300 MILLIGRAM(S): 400 CAPSULE ORAL at 07:40

## 2019-03-29 NOTE — DISCHARGE NOTE PROVIDER - NSDCACTIVITY_GEN_ALL_CORE
Walking - Indoors allowed/Do not make important decisions/Do not drive or operate machinery/No heavy lifting/straining

## 2019-03-29 NOTE — CHART NOTE - NSCHARTNOTEFT_GEN_A_CORE
Called by Ortho PA for persistent dizziness    patient seen and examined  chart reviewed    Pt sitting in bed with HOB elevated to 70 degrees. states when her head is resting on the pillow in that position she feels stable. if she saadia flat on bed or trying to lean forward (head off the pillow) she feels dizzy. her head spins. turning to right- no dizziness; turning to left dizziness +. no nausea/ vomits  Hemodynamically stable. 12 EKG ordered and pending  not inpain; no ear/ nasal discharge/ no URI/ no ear problems  S1S2 + rrr; CTA beatriz, PPulses +; soft abd, BS +;     pt gives h/o similar episode over 3 yrs ago occurred directly related to accupuncture. she also has hives with aspirin. with vicodin, she 'goes crazy'    pt received fentanyl prior to coming out of OR. came to PACU at 12 past and since then has been dizzy.     Recommend-    >D/C Fentanyl. Avoid all narcotics; patient on cont IVF, await for med to be flushed;     >Toradol IV Q 8 hrs PRN for mod pain ordered  pt takes celebrex at home without any reaction.  However if any cross reactivity noted, monitor and report to MD    > will not start meclizine at this time.  if persistent symptoms by AM, can then begin meclizine    will sign out to consult service

## 2019-03-29 NOTE — DISCHARGE NOTE PROVIDER - NSDCCPCAREPLAN_GEN_ALL_CORE_FT
PRINCIPAL DISCHARGE DIAGNOSIS  Diagnosis: Primary osteoarthritis of left hip  Assessment and Plan of Treatment: Pain control and physical therapy

## 2019-03-29 NOTE — DISCHARGE NOTE PROVIDER - CARE PROVIDER_API CALL
Endy Young)  Orthopaedic Surgery  [address], [facility] [address]  [place] [address]  Phone: [phone]  Fax: [phone]  Follow Up Time:

## 2019-03-29 NOTE — PHYSICAL THERAPY INITIAL EVALUATION ADULT - ADDITIONAL COMMENTS
Pt reports living in a private house with her . 4 steps to enter with rail. Independent at baseline with SAC. Has rollator and commode as well. Pt will be alone during the day.

## 2019-03-29 NOTE — BRIEF OPERATIVE NOTE - NSICDXBRIEFPROCEDURE_GEN_ALL_CORE_FT
PROCEDURES:  Total replacement of left hip joint by anterior approach 29-Mar-2019 12:16:17  Endy Young

## 2019-03-29 NOTE — DISCHARGE NOTE PROVIDER - NSDCFUADDINST_GEN_ALL_CORE_FT
The patient will be seen in the office in 2 weeks for wound check. PLEASE CONTACT OFFICE TO ARRANGE FOLLOW-UP APPOINTMENT DATE.  . Patient may shower after post-op day #5 (4/3/19). The dressing is to be removed on post op day #7 (4/5/19). IF THE DRESSING BECOMES SOILED BEFORE THE REMOVAL DATE, CHANGE WITH A SIMILAR DRESSING. IF THE DRESSING BECOMES STAINED WITH DISCHARGE, CONTACT THE OFFICE FOR FURTHER DIRECTIONS.  The patient will contact the office if the wound becomes red, has increasing pain, develops bleeding or discharge, an injury occurs, or has other concerns. The patient will continue PT consistent with anterior total hip replacement protocol. The patient will continue to practice anterior total hip precautions for a minimum of 6 week. The patient will continue Lovenox for 4 weeks for DVTP.  The patient will take Celebrex for 3 weeks for the prevention of heterotopic bone formation. The patient will take OXYCODONE AND TYLENOL for pain control and titrate according to prescription and patient needs. The patient will take Colace while taking oxycodone to prevent narcotic associated constipation.  Additionally, increase water intake (drink at least 8 glasses of water daily) and try adding fiber to the diet by eating fruits, vegetables and foods that are rich in grains. If constipation is experienced, contact the medical/primary care provider to discuss further treatment options. The patient is FULL weight bearing. The patient will be seen in the office in 2 weeks for wound check. PLEASE CONTACT OFFICE TO ARRANGE FOLLOW-UP APPOINTMENT DATE.  . Patient may shower after post-op day #5 (4/3/19). The dressing is to be removed on post op day #7 (4/5/19). IF THE DRESSING BECOMES SOILED BEFORE THE REMOVAL DATE, CHANGE WITH A SIMILAR DRESSING. IF THE DRESSING BECOMES STAINED WITH DISCHARGE, CONTACT THE OFFICE FOR FURTHER DIRECTIONS.  The patient will contact the office if the wound becomes red, has increasing pain, develops bleeding or discharge, an injury occurs, or has other concerns. The patient will continue PT consistent with anterior total hip replacement protocol. The patient will continue to practice anterior total hip precautions for a minimum of 6 week. The patient will continue Lovenox for 4 weeks for DVTP.  The patient will take Celebrex for 3 weeks for the prevention of heterotopic bone formation and pain. The patient will take TYLENOL for pain control and titrate according to prescription and patient needs. The patient is FULL weight bearing. The patient will be seen in the office in 2 weeks for wound check. PLEASE CONTACT OFFICE TO ARRANGE FOLLOW-UP APPOINTMENT DATE.  . Patient may shower after post-op day #5 (4/3/19). The dressing is to be removed on post op day #10 (4/8/19). IF THE DRESSING BECOMES SOILED BEFORE THE REMOVAL DATE, CHANGE WITH A SIMILAR DRESSING. IF THE DRESSING BECOMES STAINED WITH DISCHARGE, CONTACT THE OFFICE FOR FURTHER DIRECTIONS.  The patient will contact the office if the wound becomes red, has increasing pain, develops bleeding or discharge, an injury occurs, or has other concerns. The patient will continue PT consistent with anterior total hip replacement protocol. The patient will continue to practice anterior total hip precautions for a minimum of 6 week. The patient will continue Lovenox for 4 weeks for DVTP.  The patient will take Celebrex for 3 weeks for the prevention of heterotopic bone formation and pain. The patient will take TYLENOL for pain control and titrate according to prescription and patient needs.   Tramadol prescribed if needed.  The patient is FULL weight bearing.

## 2019-03-29 NOTE — PROGRESS NOTE ADULT - SUBJECTIVE AND OBJECTIVE BOX
ORTHO-POST-OP PROGRESS NOTE:      [id number]    ROB LINDSEY      PROCEDURE: Left Total hip arthroplasty direct anterior approach  Surgeon: Dr. Young  DOS: 3/29/19      Patient seen and examined at bedside. Sitting in bed, eating dinner. Complaining of persistent dizziness since procedure. Dr. Virgen evaluated patient and recommended Toradol and to continue to observe. If dizziness persists into tomorrow morning Meclizine will be considered. No acute motor or sensory complaints. Pain well controlled. Patient has not gotten up out of bed yet. No other complaints.       I&O's Detail    29 Mar 2019 07:01  -  29 Mar 2019 20:04  --------------------------------------------------------  IN:    lactated ringers.: 600 mL    Oral Fluid: 240 mL  Total IN: 840 mL    OUT:    Voided: 650 mL  Total OUT: 650 mL    Total NET: 190 mL        acetaminophen   Tablet .. 650 milliGRAM(s) Oral every 6 hours  acetaminophen   Tablet .. 975 milliGRAM(s) Oral every 8 hours  aluminum hydroxide/magnesium hydroxide/simethicone Suspension 30 milliLiter(s) Oral four times a day PRN  clindamycin IVPB 900 milliGRAM(s) IV Intermittent <User Schedule>  docusate sodium 100 milliGRAM(s) Oral three times a day  ergocalciferol 12199 Unit(s) Oral every week  HYDROmorphone   Tablet 2 milliGRAM(s) Oral every 3 hours PRN  HYDROmorphone  Injectable 0.5 milliGRAM(s) IV Push every 4 hours PRN  ketorolac   Injectable 30 milliGRAM(s) IV Push every 8 hours PRN  ketorolac   Injectable 15 milliGRAM(s) IV Push every 6 hours  lactated ringers. 1000 milliLiter(s) IV Continuous <Continuous>  magnesium hydroxide Suspension 30 milliLiter(s) Oral at bedtime PRN  multivitamin/minerals 1 Tablet(s) Oral daily  ondansetron Injectable 4 milliGRAM(s) IV Push every 6 hours PRN  oxyCODONE    IR 5 milliGRAM(s) Oral every 3 hours PRN  oxyCODONE    IR 10 milliGRAM(s) Oral every 3 hours PRN  oxyCODONE  ER Tablet 10 milliGRAM(s) Oral every 12 hours  polyethylene glycol 3350 17 Gram(s) Oral daily  senna 2 Tablet(s) Oral at bedtime PRN  simvastatin 10 milliGRAM(s) Oral at bedtime  vancomycin  IVPB 1000 milliGRAM(s) IV Intermittent <User Schedule>        T(C): 36.4 (03-29-19 @ 19:25), Max: 37.4 (03-29-19 @ 12:44)  HR: 68 (03-29-19 @ 19:25) (54 - 74)  BP: 110/67 (03-29-19 @ 19:25) (91/50 - 130/71)  RR: 18 (03-29-19 @ 19:25) (11 - 20)  SpO2: 96% (03-29-19 @ 19:25) (96% - 100%)  Wt(kg): --      PHYSICAL EXAM:     Constitutional: Alert, responsive, in no acute distress.     Left lower extremity: Hip dressing clean, dry, intact. No bleeding or discharge noted. Sensation to light touch grossly intact L2-S2. +plantarflexion/dorsiflexion/EHL/FHL. Calf soft NT. Dorsalis pedis pulse 2+. BCR.      < from: Xray Hip 2-3 Views, Left (03.29.19 @ 13:24) >     EXAM:  XR HIP 2-3V LT                          PROCEDURE DATE:  03/29/2019          INTERPRETATION:  HISTORY: Total hip replacement    Two views of the LEFT hip are submitted.    Evaluation demonstrates both femoral and acetabular components   well-seated and in good anatomic alignment. There is no evidence of   fracture. The visualized pelvis appears within normal limits.  Impression:  Hip replacement as described above.    < end of copied text >                                                        A/P :  69y Female S/P Left THR DAA   POD#0     -  Pain control  -  DVT ppx: Lovenox 40QD, SCDS, ankle pumps   -  Physical therapy  -  Weight bearing status: WBAT with assistance of a rolling walker  -  Postop abx: Clindamycin/vanco  -  Dispo: Home   - ORTHO-POST-OP PROGRESS NOTE:      [id number]    ROB LINDSEY      PROCEDURE: Left Total hip arthroplasty direct anterior approach  Surgeon: Dr. Young  DOS: 3/29/19      Patient seen and examined at bedside. Sitting in bed, eating dinner. Complaining of persistent dizziness since procedure. Dr. Virgen evaluated patient and recommended Toradol and to continue to observe. If dizziness persists into tomorrow morning Meclizine will be considered. No acute motor or sensory complaints. Pain well controlled. Patient has not gotten up out of bed yet. No other complaints.       I&O's Detail    29 Mar 2019 07:01  -  29 Mar 2019 20:04  --------------------------------------------------------  IN:    lactated ringers.: 600 mL    Oral Fluid: 240 mL  Total IN: 840 mL    OUT:    Voided: 650 mL  Total OUT: 650 mL    Total NET: 190 mL        acetaminophen   Tablet .. 650 milliGRAM(s) Oral every 6 hours  acetaminophen   Tablet .. 975 milliGRAM(s) Oral every 8 hours  aluminum hydroxide/magnesium hydroxide/simethicone Suspension 30 milliLiter(s) Oral four times a day PRN  clindamycin IVPB 900 milliGRAM(s) IV Intermittent <User Schedule>  docusate sodium 100 milliGRAM(s) Oral three times a day  ergocalciferol 71927 Unit(s) Oral every week  HYDROmorphone   Tablet 2 milliGRAM(s) Oral every 3 hours PRN  HYDROmorphone  Injectable 0.5 milliGRAM(s) IV Push every 4 hours PRN  ketorolac   Injectable 30 milliGRAM(s) IV Push every 8 hours PRN  ketorolac   Injectable 15 milliGRAM(s) IV Push every 6 hours  lactated ringers. 1000 milliLiter(s) IV Continuous <Continuous>  magnesium hydroxide Suspension 30 milliLiter(s) Oral at bedtime PRN  multivitamin/minerals 1 Tablet(s) Oral daily  ondansetron Injectable 4 milliGRAM(s) IV Push every 6 hours PRN  oxyCODONE    IR 5 milliGRAM(s) Oral every 3 hours PRN  oxyCODONE    IR 10 milliGRAM(s) Oral every 3 hours PRN  oxyCODONE  ER Tablet 10 milliGRAM(s) Oral every 12 hours  polyethylene glycol 3350 17 Gram(s) Oral daily  senna 2 Tablet(s) Oral at bedtime PRN  simvastatin 10 milliGRAM(s) Oral at bedtime  vancomycin  IVPB 1000 milliGRAM(s) IV Intermittent <User Schedule>        T(C): 36.4 (03-29-19 @ 19:25), Max: 37.4 (03-29-19 @ 12:44)  HR: 68 (03-29-19 @ 19:25) (54 - 74)  BP: 110/67 (03-29-19 @ 19:25) (91/50 - 130/71)  RR: 18 (03-29-19 @ 19:25) (11 - 20)  SpO2: 96% (03-29-19 @ 19:25) (96% - 100%)  Wt(kg): --      PHYSICAL EXAM:     Constitutional: Alert, responsive, in no acute distress.     Left lower extremity: Hip dressing clean, dry, intact. No bleeding or discharge noted. Sensation to light touch grossly intact L2-S2. +plantarflexion/dorsiflexion/EHL/FHL. Calf soft NT. Dorsalis pedis pulse 2+. BCR.      < from: Xray Hip 2-3 Views, Left (03.29.19 @ 13:24) >     EXAM:  XR HIP 2-3V LT                          PROCEDURE DATE:  03/29/2019          INTERPRETATION:  HISTORY: Total hip replacement    Two views of the LEFT hip are submitted.    Evaluation demonstrates both femoral and acetabular components   well-seated and in good anatomic alignment. There is no evidence of   fracture. The visualized pelvis appears within normal limits.  Impression:  Hip replacement as described above.    < end of copied text >                                                        A/P :  64 y/o Female S/P Left THR DAA   POD#0     -  Pain control  -  DVT ppx: Lovenox 40QD, SCDS, ankle pumps   -  Physical therapy  -  Weight bearing status: WBAT with assistance of a rolling walker  -  Postop abx: Clindamycin/vanco  -  Dispo: Home   -

## 2019-03-29 NOTE — PHYSICAL THERAPY INITIAL EVALUATION ADULT - ACTIVE RANGE OF MOTION EXAMINATION, REHAB EVAL
bilateral upper extremity Active ROM was WFL (within functional limits)/bilateral  lower extremity Active ROM was WFL (within functional limits)/hip precautions maintained

## 2019-03-29 NOTE — CONSULT NOTE ADULT - SUBJECTIVE AND OBJECTIVE BOX
PMD: Dr. Perera  Cardiologist: Dr. Simon    CC: Left hip pain    HPI:  64 y/o female with PMH borderline DM, seasonal asthma, glaucoma presents with chronic history of left hip pain for which she has undergone PT with initial improvement, however, the pain continues to persist.       PAST MEDICAL & SURGICAL HISTORY:  Seasonal asthma  Borderline diabetes mellitus  Glaucoma  S/P laparoscopy    FAMILY HISTORY:  Family history of CHF (congestive heart failure) (Father)    SOCIAL HISTORY:  Lives with spouse  Tobacco - Denies  ETOH - Denies  Drug Use - Denies    ALLERGIES:  PCN - Rash  ASA - Rash    HOME MEDICATIONS:  Diclofenac 1% topical gel  Brimonidine  Celebrex 200mg   Simvastatin 10mg PO qHS  Vitamin D2 50,000IU qWeek  Centrum Silver 1tab Po daily  CoQ10 daily  Azopt 1% 2gtt to each eye daily    MEDICATIONS  (STANDING):  clindamycin IVPB 900 milliGRAM(s) IV Intermittent <User Schedule>  lactated ringers. 1000 milliLiter(s) (100 mL/Hr) IV Continuous <Continuous>  vancomycin  IVPB 1000 milliGRAM(s) IV Intermittent <User Schedule>    MEDICATIONS  (PRN):  fentaNYL    Injectable 50 MICROGram(s) IV Push every 10 minutes PRN Severe Pain (7 - 10)  ondansetron Injectable 4 milliGRAM(s) IV Push once PRN Nausea and/or Vomiting      REVIEW OF SYSTEMS      General:	    Skin/Breast:  	  Ophthalmologic:  	  ENMT:	    Respiratory and Thorax:  	  Cardiovascular:	    Gastrointestinal:	    Genitourinary:	    Musculoskeletal:	    Neurological:	    Psychiatric:	    Hematology/Lymphatics:	    Endocrine:	    Allergic/Immunologic:	      Vital Signs Last 24 Hrs  T(C): 37.4 (29 Mar 2019 12:44), Max: 37.4 (29 Mar 2019 12:44)  T(F): 99.4 (29 Mar 2019 12:44), Max: 99.4 (29 Mar 2019 12:44)  HR: 58 (29 Mar 2019 14:40) (54 - 74)  BP: 115/59 (29 Mar 2019 14:40) (91/50 - 118/77)  BP(mean): --  RR: 12 (29 Mar 2019 14:40) (12 - 19)  SpO2: 97% (29 Mar 2019 14:40) (97% - 100%)    PHYSICAL EXAM:      Constitutional:    Eyes:    ENMT:    Neck:    Breasts:    Back:    Respiratory:    Cardiovascular:    Gastrointestinal:    Genitourinary:    Rectal:    Extremities:    Vascular:    Neurological:    Skin:    Lymph Nodes:    Musculoskeletal:    Psychiatric:          RADIOLOGY & ADDITIONAL STUDIES:  < from: Xray Hip 2-3 Views, Left (03.29.19 @ 13:24) >   EXAM:  XR HIP 2-3V LT                          PROCEDURE DATE:  03/29/2019          INTERPRETATION:  HISTORY: Total hip replacement    Two views of the LEFT hip are submitted.    Evaluation demonstrates both femoral and acetabular components   well-seated and in good anatomic alignment. There is no evidence of   fracture. The visualized pelvis appears within normal limits.  Impression:  Hip replacement as described above.                ROCIO JONES M.D., ATTENDING RADIOLOGIST  This document has been electronically signed. Mar 29 2019  2:14PM              < end of copied text > PMD: Dr. Perera  Cardiologist: Dr. Simon    CC: Left hip pain    HPI:  64 y/o female with PMH borderline DM, seasonal asthma, glaucoma presents with progressive left hip pain for 1 year for which she has undergone PT with initial improvement, however, the pain continued to persist, limited relief with injection, now s/p Left SUSAN POD#0.       PAST MEDICAL & SURGICAL HISTORY:  Seasonal asthma  Borderline diabetes mellitus  Glaucoma  Hyperlipidemia  S/P laparoscopy    FAMILY HISTORY:  Family history of CHF (congestive heart failure) (Father)    SOCIAL HISTORY:  Lives with spouse  Tobacco - Denies  ETOH - Denies  Drug Use - Denies    ALLERGIES:  PCN - Rash  ASA - Rash    HOME MEDICATIONS:  Diclofenac 1% topical gel  Brimonidine  Celebrex 200mg PO daily prn  Simvastatin 10mg PO qHS  Vitamin D2 50,000IU qWeek  Centrum Silver 1tab Po daily  CoQ10 daily  Azopt 1% 2gtt to each eye daily    MEDICATIONS  (STANDING):  clindamycin IVPB 900 milliGRAM(s) IV Intermittent <User Schedule>  lactated ringers. 1000 milliLiter(s) (100 mL/Hr) IV Continuous <Continuous>  vancomycin  IVPB 1000 milliGRAM(s) IV Intermittent <User Schedule>    MEDICATIONS  (PRN):  fentaNYL    Injectable 50 MICROGram(s) IV Push every 10 minutes PRN Severe Pain (7 - 10)  ondansetron Injectable 4 milliGRAM(s) IV Push once PRN Nausea and/or Vomiting    REVIEW OF SYSTEMS:  CONSTITUTIONAL: No fever, weight loss, or fatigue  RESPIRATORY: No cough, wheezing, or chills; No shortness of breath  CARDIOVASCULAR: No chest pain, palpitations, or leg swelling; (+)dizziness  GASTROINTESTINAL: No abdominal or epigastric pain. (+) Nausea, no vomiting; No diarrhea or constipation.   GENITOURINARY: No dysuria, frequency, hematuria, or incontinence  NEUROLOGICAL: No headaches, (+) bilateral LE numbness secondary to anesthesia  SKIN: No itching, burning, rashes, or lesions   MUSCULOSKELETAL: Left hip pain  PSYCHIATRIC: No depression, anxiety, mood swings, or difficulty sleeping      Vital Signs Last 24 Hrs  T(C): 37.4 (29 Mar 2019 12:44), Max: 37.4 (29 Mar 2019 12:44)  T(F): 99.4 (29 Mar 2019 12:44), Max: 99.4 (29 Mar 2019 12:44)  HR: 58 (29 Mar 2019 14:40) (54 - 74)  BP: 115/59 (29 Mar 2019 14:40) (91/50 - 118/77)  BP(mean): --  RR: 12 (29 Mar 2019 14:40) (12 - 19)  SpO2: 97% (29 Mar 2019 14:40) (97% - 100%)    PHYSICAL EXAM:  GENERAL: NAD  HEAD:  Atraumatic, Normocephalic  NECK: Supple, No JVD, Normal thyroid  NERVOUS SYSTEM:  Alert & Oriented X3, Good concentration; no focal deficits  CHEST/LUNG: Clear to auscultation bilaterally; No rales, rhonchi, wheezing, or rubs  HEART: Regular rate and rhythm; No murmurs, rubs, or gallops  ABDOMEN: Soft, Nontender, Nondistended; Bowel sounds present  EXTREMITIES:  2+ Peripheral Pulses, Left hip with clean dressing          RADIOLOGY & ADDITIONAL STUDIES:  < from: Xray Hip 2-3 Views, Left (03.29.19 @ 13:24) >   EXAM:  XR HIP 2-3V LT                          PROCEDURE DATE:  03/29/2019          INTERPRETATION:  HISTORY: Total hip replacement    Two views of the LEFT hip are submitted.    Evaluation demonstrates both femoral and acetabular components   well-seated and in good anatomic alignment. There is no evidence of   fracture. The visualized pelvis appears within normal limits.  Impression:  Hip replacement as described above.                ROCIO JONES M.D., ATTENDING RADIOLOGIST  This document has been electronically signed. Mar 29 2019  2:14PM              < end of copied text >

## 2019-03-29 NOTE — DISCHARGE NOTE PROVIDER - HOSPITAL COURSE
The patient underwent a Left ANTERIOR TOTAL HIP REPLACEMENT on 3/29/19. The patient received antibiotics consistent with SCIP guidelines. The patient underwent the procedure and had no intra-operative complications. Post-operatively, the patient was seen by medicine and PT. The patient received LOVENOX for DVTP. The patient received pain medications per orthopedic pain management protocol and the pain was appropriately controlled. Patient was instructed on anterior total hip precautions by PT. The patient did not have any post-operative medical complications. The patient was discharged in stable condition.

## 2019-03-29 NOTE — CONSULT NOTE ADULT - PROBLEM SELECTOR RECOMMENDATION 9
S/p Left SUSAN POD#0  Pain control.  PT/OT.  Antibiotics, wound care, and DVT prophylaxis as per Ortho.  Incentive spirometry.  Avoid opioid induced constipation.

## 2019-03-29 NOTE — CONSULT NOTE ADULT - ASSESSMENT
62 y/o female with PMH borderline DM, seasonal asthma, glaucoma presents with progressive left hip pain for 1 year for which she has undergone PT with initial improvement, however, the pain continued to persist, limited relief with injection, now s/p Left SUSAN POD#0.

## 2019-03-29 NOTE — CONSULT NOTE ADULT - ATTENDING COMMENTS
Patient seen and examined on PACU , s/p L SUSAN , POD # 0   Tolerated procedure well ,   d/w NP Dacia , above reviewed ,   patient with Hx of Seasonal asthma - stable , asymptomatic,  agree wit note and A & P.  Thank you for the courtesy of this consultation , will follow .  Dr Cam will take over in am .

## 2019-03-30 LAB
ANION GAP SERPL CALC-SCNC: 10 MMOL/L — SIGNIFICANT CHANGE UP (ref 5–17)
BUN SERPL-MCNC: 9 MG/DL — SIGNIFICANT CHANGE UP (ref 8–20)
CALCIUM SERPL-MCNC: 8.4 MG/DL — LOW (ref 8.6–10.2)
CHLORIDE SERPL-SCNC: 104 MMOL/L — SIGNIFICANT CHANGE UP (ref 98–107)
CO2 SERPL-SCNC: 25 MMOL/L — SIGNIFICANT CHANGE UP (ref 22–29)
CREAT SERPL-MCNC: 0.59 MG/DL — SIGNIFICANT CHANGE UP (ref 0.5–1.3)
GLUCOSE SERPL-MCNC: 124 MG/DL — HIGH (ref 70–115)
HCT VFR BLD CALC: 31.2 % — LOW (ref 37–47)
HGB BLD-MCNC: 10.4 G/DL — LOW (ref 12–16)
MCHC RBC-ENTMCNC: 30.5 PG — SIGNIFICANT CHANGE UP (ref 27–31)
MCHC RBC-ENTMCNC: 33.3 G/DL — SIGNIFICANT CHANGE UP (ref 32–36)
MCV RBC AUTO: 91.5 FL — SIGNIFICANT CHANGE UP (ref 81–99)
PLATELET # BLD AUTO: 257 K/UL — SIGNIFICANT CHANGE UP (ref 150–400)
POTASSIUM SERPL-MCNC: 4.3 MMOL/L — SIGNIFICANT CHANGE UP (ref 3.5–5.3)
POTASSIUM SERPL-SCNC: 4.3 MMOL/L — SIGNIFICANT CHANGE UP (ref 3.5–5.3)
RBC # BLD: 3.41 M/UL — LOW (ref 4.4–5.2)
RBC # FLD: 12.4 % — SIGNIFICANT CHANGE UP (ref 11–15.6)
SODIUM SERPL-SCNC: 139 MMOL/L — SIGNIFICANT CHANGE UP (ref 135–145)
WBC # BLD: 11.9 K/UL — HIGH (ref 4.8–10.8)
WBC # FLD AUTO: 11.9 K/UL — HIGH (ref 4.8–10.8)

## 2019-03-30 PROCEDURE — 99232 SBSQ HOSP IP/OBS MODERATE 35: CPT

## 2019-03-30 RX ORDER — ENOXAPARIN SODIUM 100 MG/ML
40 INJECTION SUBCUTANEOUS
Qty: 27 | Refills: 0 | OUTPATIENT
Start: 2019-03-30 | End: 2019-04-25

## 2019-03-30 RX ADMIN — DORZOLAMIDE HYDROCHLORIDE 2 DROP(S): 20 SOLUTION/ DROPS OPHTHALMIC at 10:15

## 2019-03-30 RX ADMIN — SIMVASTATIN 10 MILLIGRAM(S): 20 TABLET, FILM COATED ORAL at 21:24

## 2019-03-30 RX ADMIN — ERGOCALCIFEROL 50000 UNIT(S): 1.25 CAPSULE ORAL at 14:40

## 2019-03-30 RX ADMIN — Medication 975 MILLIGRAM(S): at 05:11

## 2019-03-30 RX ADMIN — Medication 15 MILLIGRAM(S): at 05:07

## 2019-03-30 RX ADMIN — Medication 975 MILLIGRAM(S): at 05:07

## 2019-03-30 RX ADMIN — Medication 975 MILLIGRAM(S): at 21:24

## 2019-03-30 RX ADMIN — Medication 100 MILLIGRAM(S): at 05:07

## 2019-03-30 RX ADMIN — SCOPALAMINE 1 PATCH: 1 PATCH, EXTENDED RELEASE TRANSDERMAL at 08:40

## 2019-03-30 RX ADMIN — Medication 100 MILLIGRAM(S): at 13:28

## 2019-03-30 RX ADMIN — Medication 975 MILLIGRAM(S): at 21:27

## 2019-03-30 RX ADMIN — Medication 15 MILLIGRAM(S): at 05:11

## 2019-03-30 RX ADMIN — SCOPALAMINE 1 PATCH: 1 PATCH, EXTENDED RELEASE TRANSDERMAL at 19:33

## 2019-03-30 RX ADMIN — SODIUM CHLORIDE 150 MILLILITER(S): 9 INJECTION, SOLUTION INTRAVENOUS at 05:07

## 2019-03-30 RX ADMIN — CELECOXIB 200 MILLIGRAM(S): 200 CAPSULE ORAL at 17:02

## 2019-03-30 RX ADMIN — Medication 100 MILLIGRAM(S): at 21:24

## 2019-03-30 RX ADMIN — Medication 975 MILLIGRAM(S): at 13:28

## 2019-03-30 RX ADMIN — Medication 1 TABLET(S): at 12:36

## 2019-03-30 RX ADMIN — CELECOXIB 200 MILLIGRAM(S): 200 CAPSULE ORAL at 18:10

## 2019-03-30 RX ADMIN — Medication 975 MILLIGRAM(S): at 14:36

## 2019-03-30 RX ADMIN — ENOXAPARIN SODIUM 40 MILLIGRAM(S): 100 INJECTION SUBCUTANEOUS at 05:07

## 2019-03-30 RX ADMIN — POLYETHYLENE GLYCOL 3350 17 GRAM(S): 17 POWDER, FOR SOLUTION ORAL at 12:36

## 2019-03-30 NOTE — OCCUPATIONAL THERAPY INITIAL EVALUATION ADULT - ADDITIONAL COMMENTS
Pt lives in private home, 4 steps to enter, no stairs inside.  She is home alone during the day.  PTA pt ambulated with SAC due to progressive pain.  She has a rollator and commode.  Her  was assisting with LB dressing recently.  She does not drive.

## 2019-03-30 NOTE — PROGRESS NOTE ADULT - SUBJECTIVE AND OBJECTIVE BOX
ROB LINDSEY    [id number]    History: Patient is status post left anterior total hip arthroplasty, POD # 1. Patient is doing well and is comfortable. The patient's pain is controlled using the prescribed pain medications. The patient was OOB to bathroom last night. She had dizziness yesterday all day, but is feeling better today. Her pain is controlled now and does not want to take oxycodone due to the dizziness she felt yesterday. Denies nausea, vomiting, chest pain, shortness of breath, abdominal pain or fever. No new complaints.          03-30    139  |  104  |  9.0  ----------------------------<  124<H>  4.3   |  25.0  |  0.59    Ca    8.4<L>      30 Mar 2019 06:36        MEDICATIONS  (STANDING):  acetaminophen   Tablet .. 650 milliGRAM(s) Oral every 6 hours  acetaminophen   Tablet .. 975 milliGRAM(s) Oral every 8 hours  celecoxib 200 milliGRAM(s) Oral two times a day  docusate sodium 100 milliGRAM(s) Oral three times a day  dorzolamide 2% Ophthalmic Solution 2 Drop(s) Both EYES <User Schedule>  enoxaparin Injectable 40 milliGRAM(s) SubCutaneous every 24 hours  ergocalciferol 35356 Unit(s) Oral every week  lactated ringers. 1000 milliLiter(s) (150 mL/Hr) IV Continuous <Continuous>  multivitamin/minerals 1 Tablet(s) Oral daily  oxyCODONE  ER Tablet 10 milliGRAM(s) Oral every 12 hours  polyethylene glycol 3350 17 Gram(s) Oral daily  simvastatin 10 milliGRAM(s) Oral at bedtime    MEDICATIONS  (PRN):  aluminum hydroxide/magnesium hydroxide/simethicone Suspension 30 milliLiter(s) Oral four times a day PRN Indigestion  HYDROmorphone   Tablet 2 milliGRAM(s) Oral every 3 hours PRN Severe Pain (7 - 10)  HYDROmorphone  Injectable 0.5 milliGRAM(s) IV Push every 4 hours PRN Severe Pain (7 - 10)  ketorolac   Injectable 30 milliGRAM(s) IV Push every 8 hours PRN Moderate Pain (4 - 6)  magnesium hydroxide Suspension 30 milliLiter(s) Oral at bedtime PRN Constipation  ondansetron Injectable 4 milliGRAM(s) IV Push every 6 hours PRN Nausea and/or Vomiting  oxyCODONE    IR 5 milliGRAM(s) Oral every 3 hours PRN Mild Pain (1 - 3)  oxyCODONE    IR 10 milliGRAM(s) Oral every 3 hours PRN Moderate Pain (4 - 6)  senna 2 Tablet(s) Oral at bedtime PRN Constipation    Vital Signs Last 24 Hrs  T(C): 36.1 (30 Mar 2019 05:00), Max: 37.4 (29 Mar 2019 12:44)  T(F): 97 (30 Mar 2019 05:00), Max: 99.4 (29 Mar 2019 12:44)  HR: 86 (30 Mar 2019 05:00) (54 - 86)  BP: 93/55 (30 Mar 2019 05:00) (91/50 - 130/71)  BP(mean): --  RR: 18 (30 Mar 2019 05:00) (11 - 20)  SpO2: 96% (30 Mar 2019 05:00) (96% - 100%)  Lying in bed inNAD  Physical exam: Left lower extremity- The left hip dressing is clean, dry and intact. No drainage or discharge. No erythema is noted. No blistering. No ecchymosis. The calf is supple. No calf tenderness. Sensation to light touch is grossly intact distally. The lateral cutaneous nerve is intact. Motor function distally is 5/5. No foot drop. +ehl/fhl. 2+ dorsalis pedis pulse. Capillary refill is less than 2 seconds. No cyanosis.    Primary Orthopedic Assessment:  • s/p LEFT ANTERIOR total hip replacement, POD#1      Plan:   - AM labs pending  • DVT prophylaxis as prescribed- Lovenox, including use of compression devices and ankle pumps  •  Continue physical therapy- stairs  • Weightbearing as tolerated of the left lower extremity with assistance of a walker  • Incentive spirometry encouraged  • Pain control as clinically indicated  • Anterior hip precautions reviewed with patient  - Medicine follow up  • Discharge planning – anticipated discharge is Home when cleared by PT and medicine

## 2019-03-30 NOTE — PROGRESS NOTE ADULT - ASSESSMENT
62 y/o female with PMH borderline DM, seasonal asthma, glaucoma presents with progressive left hip pain, now s/p Left SUSAN POD#0.      Problem/Recommendation - 1:  Problem: Unilateral primary osteoarthritis, left hip. Recommendation: S/p Left SUSAN POD#0  Pain control.  PT/OT.  Antibiotics, wound care, and DVT prophylaxis as per Ortho.  Incentive spirometry.  Avoid opioid induced constipation.     Problem/Recommendation - 2:  ·  Problem: Hyperlipidemia, unspecified hyperlipidemia type.  Recommendation: Continue statin.      Problem/Recommendation - 3:  ·  Problem: Glaucoma.  Recommendation: hx of , b/l -Continue home medications.     Problem/Recommendation - 4:    Acute blood loss anemia - 2/2 surgical blood loss - HD stable,. monitor CBC in am     Problem/Recommendation - 5:   Leucocytosis - likely reactive     will follow 64 y/o female with PMH borderline DM, seasonal asthma, glaucoma presents with progressive left hip pain, now s/p Left SUSAN     Problem/Recommendation - 1:  Problem: Unilateral primary osteoarthritis, left hip. Recommendation: S/p Left SUSAN  Pain control.  PT/OT.  Antibiotics, wound care, and DVT prophylaxis as per Ortho.  Incentive spirometry.  Avoid opioid induced constipation.     Problem/Recommendation - 2:  ·  Problem: Hyperlipidemia, unspecified hyperlipidemia type.  Recommendation: Continue statin.      Problem/Recommendation - 3:  ·  Problem: Glaucoma.  Recommendation: hx of , b/l -Continue home medications.     Problem/Recommendation - 4:    Acute blood loss anemia - 2/2 surgical blood loss - HD stable,. monitor CBC in am     Problem/Recommendation - 5:   Leucocytosis - likely reactive     will follow

## 2019-03-30 NOTE — PROGRESS NOTE ADULT - SUBJECTIVE AND OBJECTIVE BOX
ROB LINDSEY    [id number]    63y      Female    CC: Left hip pain s/p Lt SUSAN POD#1      INTERVAL HPI/OVERNIGHT EVENTS: no acute events    REVIEW OF SYSTEMS:    CONSTITUTIONAL: No fever, or fatigue  RESPIRATORY: No cough, wheezing, ; No shortness of breath  CARDIOVASCULAR: No chest pain, palpitations  GASTROINTESTINAL: No abdominal or epigastric pain. No nausea, vomiting        Vital Signs Last 24 Hrs  T(C): 36.8 (30 Mar 2019 08:00), Max: 36.8 (29 Mar 2019 17:22)  T(F): 98.3 (30 Mar 2019 08:00), Max: 98.3 (29 Mar 2019 17:22)  HR: 64 (30 Mar 2019 08:00) (57 - 86)  BP: 110/60 (30 Mar 2019 08:00) (93/55 - 130/71)  BP(mean): --  RR: 19 (30 Mar 2019 08:00) (11 - 20)  SpO2: 98% (30 Mar 2019 08:00) (96% - 100%)    PHYSICAL EXAM:    GENERAL: NAD, well-groomed  HEENT: PERRL, +EOMI  NECK: soft, Supple, No JVD,   CHEST/LUNG: Clear to percussion bilaterally; No wheezing  HEART: S1S2+, Regular rate and rhythm; No murmurs  ABDOMEN: Soft, Nontender, Nondistended; Bowel sounds present  EXTREMITIES:  No clubbing, cyanosis, or edema  SKIN: No rashes or lesions  NEURO: AAOX3,   PSYCH: normal mood      03-29 @ 07:01  -  03-30 @ 07:00  --------------------------------------------------------  IN: 2640 mL / OUT: 650 mL / NET: 1990 mL        LABS:                        10.4   11.9  )-----------( 257      ( 30 Mar 2019 06:36 )             31.2     03-30    139  |  104  |  9.0  ----------------------------<  124<H>  4.3   |  25.0  |  0.59    Ca    8.4<L>      30 Mar 2019 06:36              MEDICATIONS  (STANDING):  acetaminophen   Tablet .. 650 milliGRAM(s) Oral every 6 hours  acetaminophen   Tablet .. 975 milliGRAM(s) Oral every 8 hours  celecoxib 200 milliGRAM(s) Oral two times a day  docusate sodium 100 milliGRAM(s) Oral three times a day  dorzolamide 2% Ophthalmic Solution 2 Drop(s) Both EYES <User Schedule>  enoxaparin Injectable 40 milliGRAM(s) SubCutaneous every 24 hours  ergocalciferol 26271 Unit(s) Oral every week  lactated ringers. 1000 milliLiter(s) (150 mL/Hr) IV Continuous <Continuous>  multivitamin/minerals 1 Tablet(s) Oral daily  oxyCODONE  ER Tablet 10 milliGRAM(s) Oral every 12 hours  polyethylene glycol 3350 17 Gram(s) Oral daily  simvastatin 10 milliGRAM(s) Oral at bedtime    MEDICATIONS  (PRN):  aluminum hydroxide/magnesium hydroxide/simethicone Suspension 30 milliLiter(s) Oral four times a day PRN Indigestion  HYDROmorphone   Tablet 2 milliGRAM(s) Oral every 3 hours PRN Severe Pain (7 - 10)  HYDROmorphone  Injectable 0.5 milliGRAM(s) IV Push every 4 hours PRN Severe Pain (7 - 10)  ketorolac   Injectable 30 milliGRAM(s) IV Push every 8 hours PRN Moderate Pain (4 - 6)  magnesium hydroxide Suspension 30 milliLiter(s) Oral at bedtime PRN Constipation  ondansetron Injectable 4 milliGRAM(s) IV Push every 6 hours PRN Nausea and/or Vomiting  oxyCODONE    IR 5 milliGRAM(s) Oral every 3 hours PRN Mild Pain (1 - 3)  oxyCODONE    IR 10 milliGRAM(s) Oral every 3 hours PRN Moderate Pain (4 - 6)  senna 2 Tablet(s) Oral at bedtime PRN Constipation      RADIOLOGY & ADDITIONAL TESTS: ROB LINDSEY    [id number]    63y      Female    CC: Left hip pain s/p Lt SUSAN POD#1  Pain is fairly controlled with medicatons.  felt dizzy earlier but now is better  ambulated with PT.       INTERVAL HPI/OVERNIGHT EVENTS: no acute events    REVIEW OF SYSTEMS:    CONSTITUTIONAL: No fever, or fatigue  RESPIRATORY: No cough, wheezing, ; No shortness of breath  CARDIOVASCULAR: No chest pain, palpitations  GASTROINTESTINAL: No abdominal or epigastric pain. No nausea, vomiting        Vital Signs Last 24 Hrs  T(C): 36.8 (30 Mar 2019 08:00), Max: 36.8 (29 Mar 2019 17:22)  T(F): 98.3 (30 Mar 2019 08:00), Max: 98.3 (29 Mar 2019 17:22)  HR: 64 (30 Mar 2019 08:00) (57 - 86)  BP: 110/60 (30 Mar 2019 08:00) (93/55 - 130/71)  BP(mean): --  RR: 19 (30 Mar 2019 08:00) (11 - 20)  SpO2: 98% (30 Mar 2019 08:00) (96% - 100%)    PHYSICAL EXAM:    GENERAL: NAD, well-groomed  HEENT: PERRL, +EOMI  NECK: soft, Supple  CHEST/LUNG: Clear to percussion bilaterally; No wheezing  HEART: S1S2+, Regular rate and rhythm; No murmurs  ABDOMEN: Soft, Nontender, Nondistended; Bowel sounds present  EXTREMITIES:  No clubbing, cyanosis, or edema  SKIN: No rashes or lesions  NEURO: AAOX3,   PSYCH: normal mood      03-29 @ 07:01  -  03-30 @ 07:00  --------------------------------------------------------  IN: 2640 mL / OUT: 650 mL / NET: 1990 mL        LABS:                        10.4   11.9  )-----------( 257      ( 30 Mar 2019 06:36 )             31.2     03-30    139  |  104  |  9.0  ----------------------------<  124<H>  4.3   |  25.0  |  0.59    Ca    8.4<L>      30 Mar 2019 06:36              MEDICATIONS  (STANDING):  acetaminophen   Tablet .. 650 milliGRAM(s) Oral every 6 hours  acetaminophen   Tablet .. 975 milliGRAM(s) Oral every 8 hours  celecoxib 200 milliGRAM(s) Oral two times a day  docusate sodium 100 milliGRAM(s) Oral three times a day  dorzolamide 2% Ophthalmic Solution 2 Drop(s) Both EYES <User Schedule>  enoxaparin Injectable 40 milliGRAM(s) SubCutaneous every 24 hours  ergocalciferol 84618 Unit(s) Oral every week  lactated ringers. 1000 milliLiter(s) (150 mL/Hr) IV Continuous <Continuous>  multivitamin/minerals 1 Tablet(s) Oral daily  oxyCODONE  ER Tablet 10 milliGRAM(s) Oral every 12 hours  polyethylene glycol 3350 17 Gram(s) Oral daily  simvastatin 10 milliGRAM(s) Oral at bedtime    MEDICATIONS  (PRN):  aluminum hydroxide/magnesium hydroxide/simethicone Suspension 30 milliLiter(s) Oral four times a day PRN Indigestion  HYDROmorphone   Tablet 2 milliGRAM(s) Oral every 3 hours PRN Severe Pain (7 - 10)  HYDROmorphone  Injectable 0.5 milliGRAM(s) IV Push every 4 hours PRN Severe Pain (7 - 10)  ketorolac   Injectable 30 milliGRAM(s) IV Push every 8 hours PRN Moderate Pain (4 - 6)  magnesium hydroxide Suspension 30 milliLiter(s) Oral at bedtime PRN Constipation  ondansetron Injectable 4 milliGRAM(s) IV Push every 6 hours PRN Nausea and/or Vomiting  oxyCODONE    IR 5 milliGRAM(s) Oral every 3 hours PRN Mild Pain (1 - 3)  oxyCODONE    IR 10 milliGRAM(s) Oral every 3 hours PRN Moderate Pain (4 - 6)  senna 2 Tablet(s) Oral at bedtime PRN Constipation      RADIOLOGY & ADDITIONAL TESTS:

## 2019-03-31 LAB
ANION GAP SERPL CALC-SCNC: 9 MMOL/L — SIGNIFICANT CHANGE UP (ref 5–17)
BUN SERPL-MCNC: 11 MG/DL — SIGNIFICANT CHANGE UP (ref 8–20)
CALCIUM SERPL-MCNC: 8.2 MG/DL — LOW (ref 8.6–10.2)
CHLORIDE SERPL-SCNC: 110 MMOL/L — HIGH (ref 98–107)
CO2 SERPL-SCNC: 25 MMOL/L — SIGNIFICANT CHANGE UP (ref 22–29)
CREAT SERPL-MCNC: 0.6 MG/DL — SIGNIFICANT CHANGE UP (ref 0.5–1.3)
GLUCOSE SERPL-MCNC: 103 MG/DL — SIGNIFICANT CHANGE UP (ref 70–115)
HCT VFR BLD CALC: 30.9 % — LOW (ref 37–47)
HGB BLD-MCNC: 10.3 G/DL — LOW (ref 12–16)
MCHC RBC-ENTMCNC: 30.2 PG — SIGNIFICANT CHANGE UP (ref 27–31)
MCHC RBC-ENTMCNC: 33.3 G/DL — SIGNIFICANT CHANGE UP (ref 32–36)
MCV RBC AUTO: 90.6 FL — SIGNIFICANT CHANGE UP (ref 81–99)
PLATELET # BLD AUTO: 202 K/UL — SIGNIFICANT CHANGE UP (ref 150–400)
POTASSIUM SERPL-MCNC: 3.8 MMOL/L — SIGNIFICANT CHANGE UP (ref 3.5–5.3)
POTASSIUM SERPL-SCNC: 3.8 MMOL/L — SIGNIFICANT CHANGE UP (ref 3.5–5.3)
RBC # BLD: 3.41 M/UL — LOW (ref 4.4–5.2)
RBC # FLD: 12.8 % — SIGNIFICANT CHANGE UP (ref 11–15.6)
SODIUM SERPL-SCNC: 144 MMOL/L — SIGNIFICANT CHANGE UP (ref 135–145)
WBC # BLD: 10 K/UL — SIGNIFICANT CHANGE UP (ref 4.8–10.8)
WBC # FLD AUTO: 10 K/UL — SIGNIFICANT CHANGE UP (ref 4.8–10.8)

## 2019-03-31 PROCEDURE — 99232 SBSQ HOSP IP/OBS MODERATE 35: CPT

## 2019-03-31 RX ORDER — DICLOFENAC SODIUM 30 MG/G
0 GEL TOPICAL
Qty: 0 | Refills: 0 | COMMUNITY

## 2019-03-31 RX ORDER — CELECOXIB 200 MG/1
0 CAPSULE ORAL
Qty: 0 | Refills: 0 | COMMUNITY

## 2019-03-31 RX ORDER — ACETAMINOPHEN 500 MG
2 TABLET ORAL
Qty: 0 | Refills: 0 | COMMUNITY

## 2019-03-31 RX ORDER — CELECOXIB 200 MG/1
1 CAPSULE ORAL
Qty: 40 | Refills: 0 | OUTPATIENT
Start: 2019-03-31

## 2019-03-31 RX ADMIN — Medication 975 MILLIGRAM(S): at 13:15

## 2019-03-31 RX ADMIN — Medication 975 MILLIGRAM(S): at 05:11

## 2019-03-31 RX ADMIN — Medication 975 MILLIGRAM(S): at 22:00

## 2019-03-31 RX ADMIN — Medication 100 MILLIGRAM(S): at 13:14

## 2019-03-31 RX ADMIN — CELECOXIB 200 MILLIGRAM(S): 200 CAPSULE ORAL at 05:11

## 2019-03-31 RX ADMIN — Medication 1 TABLET(S): at 11:34

## 2019-03-31 RX ADMIN — Medication 975 MILLIGRAM(S): at 21:00

## 2019-03-31 RX ADMIN — Medication 100 MILLIGRAM(S): at 21:00

## 2019-03-31 RX ADMIN — Medication 975 MILLIGRAM(S): at 14:40

## 2019-03-31 RX ADMIN — Medication 100 MILLIGRAM(S): at 05:11

## 2019-03-31 RX ADMIN — SIMVASTATIN 10 MILLIGRAM(S): 20 TABLET, FILM COATED ORAL at 21:00

## 2019-03-31 RX ADMIN — CELECOXIB 200 MILLIGRAM(S): 200 CAPSULE ORAL at 17:24

## 2019-03-31 RX ADMIN — DORZOLAMIDE HYDROCHLORIDE 2 DROP(S): 20 SOLUTION/ DROPS OPHTHALMIC at 07:44

## 2019-03-31 RX ADMIN — SCOPALAMINE 1 PATCH: 1 PATCH, EXTENDED RELEASE TRANSDERMAL at 09:27

## 2019-03-31 RX ADMIN — Medication 975 MILLIGRAM(S): at 05:12

## 2019-03-31 RX ADMIN — CELECOXIB 200 MILLIGRAM(S): 200 CAPSULE ORAL at 18:32

## 2019-03-31 RX ADMIN — ENOXAPARIN SODIUM 40 MILLIGRAM(S): 100 INJECTION SUBCUTANEOUS at 05:11

## 2019-03-31 RX ADMIN — CELECOXIB 200 MILLIGRAM(S): 200 CAPSULE ORAL at 05:12

## 2019-03-31 RX ADMIN — POLYETHYLENE GLYCOL 3350 17 GRAM(S): 17 POWDER, FOR SOLUTION ORAL at 11:34

## 2019-03-31 NOTE — PROGRESS NOTE ADULT - ASSESSMENT
62 y/o female with PMH borderline DM, seasonal asthma, glaucoma presents with progressive left hip pain, now s/p Left SUSAN     Problem/Recommendation - 1:  Problem: Unilateral primary osteoarthritis, left hip. Recommendation: S/p Left SUSAN  Pain control.  PT/OT.  Antibiotics, wound care, and DVT prophylaxis as per Ortho.  Incentive spirometry.  Avoid opioid induced constipation.     Problem/Recommendation - 2:  ·  Problem: Hyperlipidemia, unspecified hyperlipidemia type.  Recommendation: Continue statin.      Problem/Recommendation - 3:  ·  Problem: Glaucoma.  Recommendation: hx of , b/l -Continue home medications.     Problem/Recommendation - 4:    Acute blood loss anemia - 2/2 surgical blood loss - HD stable,.  CBC stable     Problem/Recommendation - 5:   Leucocytosis - likely reactive - improved    Pre-diabetes - Diet changes , fu with PMD    Medically stable

## 2019-03-31 NOTE — PROGRESS NOTE ADULT - SUBJECTIVE AND OBJECTIVE BOX
ROB LINDSEY    [id number]    History: Patient seen and eval at bedside. Patient is doing well and is comfortable however did have throbbing as she describes after PT yesterday. The patient's pain is controlled using the prescribed pain medications however does not want to take narcotics and prefers to take tylenol only. The patient is participating in physical therapy. Denies nausea, vomiting, chest pain, shortness of breath, abdominal pain or fever.     T(C): 36.6 (03-30-19 @ 23:38), Max: 36.8 (03-30-19 @ 08:00)  HR: 71 (03-30-19 @ 23:38) (62 - 71)  BP: 110/63 (03-30-19 @ 23:38) (101/62 - 110/63)  RR: 18 (03-30-19 @ 23:38) (18 - 19)  SpO2: 97% (03-30-19 @ 23:38) (97% - 98%)                          10.3   10.0  )-----------( 202      ( 31 Mar 2019 06:00 )             30.9     03-30    139  |  104  |  9.0  ----------------------------<  124<H>  4.3   |  25.0  |  0.59    Ca    8.4<L>      30 Mar 2019 06:36    PE: NAD, alert, awake  Left LE:   Hip dressing C/D/I, no drainage, no bleeding, incision healing well, no erythema, blisters, maceration or s/o infx  EHL/TA/FHL/GS intact, DP pulse 2+  Gross sensation to LT intact distally, Calf soft, NT B/L  New sterile honeycomb dressing placed    Primary Orthopedic Assessment:  • s/p LEFT ANTERIOR total hip replacement POD#2    Plan:   • DVT prophylaxis as prescribed - Lov, including use of compression devices and ankle pumps  •  Continue physical therapy: Weightbearing as tolerated of the left lower extremity with assistance of a walker  • Incentive spirometry encouraged  • Pain control as clinically indicated - see above  • Anterior hip precautions   • Discharge planning: home pending PT and med clearance

## 2019-03-31 NOTE — PROGRESS NOTE ADULT - SUBJECTIVE AND OBJECTIVE BOX
ROB LINDSEY    [id number]    63y      Female    CC: Left hip pain s/p Lt SUSAN POD#2  Pain is fairly controlled with medicatons.  but does not feel comfortable with pain control and going home today      INTERVAL HPI/OVERNIGHT EVENTS: no acute events    REVIEW OF SYSTEMS:    CONSTITUTIONAL: No fever, or fatigue  RESPIRATORY: No cough, wheezing, ; No shortness of breath  CARDIOVASCULAR: No chest pain, palpitations  GASTROINTESTINAL: No abdominal or epigastric pain. No nausea, vomiting        Vital Signs Last 24 Hrs  T(C): 36.4 (31 Mar 2019 08:00), Max: 36.6 (30 Mar 2019 15:54)  T(F): 97.6 (31 Mar 2019 08:00), Max: 97.9 (30 Mar 2019 23:38)  HR: 62 (31 Mar 2019 08:00) (62 - 71)  BP: 114/68 (31 Mar 2019 08:00) (101/62 - 114/68)  BP(mean): --  RR: 18 (31 Mar 2019 08:00) (18 - 18)  SpO2: 97% (31 Mar 2019 08:00) (97% - 97%)    PHYSICAL EXAM:    GENERAL: NAD, well-groomed  HEENT: PERRL, +EOMI  NECK: soft, Supple  CHEST/LUNG: Clear to percussion bilaterally; No wheezing  HEART: S1S2+, Regular rate and rhythm; No murmurs  ABDOMEN: Soft, Nontender, Nondistended; Bowel sounds present  EXTREMITIES:  No clubbing, cyanosis, or edema  SKIN: No rashes or lesions  NEURO: AAOX3,   PSYCH: normal mood      03-29 @ 07:01  -  03-30 @ 07:00  --------------------------------------------------------  IN: 2640 mL / OUT: 650 mL / NET: 1990 mL        LABS:                        10.4   11.9  )-----------( 257      ( 30 Mar 2019 06:36 )             31.2     03-30    139  |  104  |  9.0  ----------------------------<  124<H>  4.3   |  25.0  |  0.59    Ca    8.4<L>      30 Mar 2019 06:36              MEDICATIONS  (STANDING):  acetaminophen   Tablet .. 650 milliGRAM(s) Oral every 6 hours  acetaminophen   Tablet .. 975 milliGRAM(s) Oral every 8 hours  celecoxib 200 milliGRAM(s) Oral two times a day  docusate sodium 100 milliGRAM(s) Oral three times a day  dorzolamide 2% Ophthalmic Solution 2 Drop(s) Both EYES <User Schedule>  enoxaparin Injectable 40 milliGRAM(s) SubCutaneous every 24 hours  ergocalciferol 28029 Unit(s) Oral every week  lactated ringers. 1000 milliLiter(s) (150 mL/Hr) IV Continuous <Continuous>  multivitamin/minerals 1 Tablet(s) Oral daily  oxyCODONE  ER Tablet 10 milliGRAM(s) Oral every 12 hours  polyethylene glycol 3350 17 Gram(s) Oral daily  simvastatin 10 milliGRAM(s) Oral at bedtime    MEDICATIONS  (PRN):  aluminum hydroxide/magnesium hydroxide/simethicone Suspension 30 milliLiter(s) Oral four times a day PRN Indigestion  HYDROmorphone   Tablet 2 milliGRAM(s) Oral every 3 hours PRN Severe Pain (7 - 10)  HYDROmorphone  Injectable 0.5 milliGRAM(s) IV Push every 4 hours PRN Severe Pain (7 - 10)  ketorolac   Injectable 30 milliGRAM(s) IV Push every 8 hours PRN Moderate Pain (4 - 6)  magnesium hydroxide Suspension 30 milliLiter(s) Oral at bedtime PRN Constipation  ondansetron Injectable 4 milliGRAM(s) IV Push every 6 hours PRN Nausea and/or Vomiting  oxyCODONE    IR 5 milliGRAM(s) Oral every 3 hours PRN Mild Pain (1 - 3)  oxyCODONE    IR 10 milliGRAM(s) Oral every 3 hours PRN Moderate Pain (4 - 6)  senna 2 Tablet(s) Oral at bedtime PRN Constipation      RADIOLOGY & ADDITIONAL TESTS:

## 2019-04-01 ENCOUNTER — TRANSCRIPTION ENCOUNTER (OUTPATIENT)
Age: 64
End: 2019-04-01

## 2019-04-01 VITALS
HEART RATE: 61 BPM | SYSTOLIC BLOOD PRESSURE: 122 MMHG | OXYGEN SATURATION: 96 % | DIASTOLIC BLOOD PRESSURE: 65 MMHG | RESPIRATION RATE: 18 BRPM | TEMPERATURE: 98 F

## 2019-04-01 PROCEDURE — 97163 PT EVAL HIGH COMPLEX 45 MIN: CPT

## 2019-04-01 PROCEDURE — 85027 COMPLETE CBC AUTOMATED: CPT

## 2019-04-01 PROCEDURE — C1776: CPT

## 2019-04-01 PROCEDURE — 82962 GLUCOSE BLOOD TEST: CPT

## 2019-04-01 PROCEDURE — 76000 FLUOROSCOPY <1 HR PHYS/QHP: CPT

## 2019-04-01 PROCEDURE — 93005 ELECTROCARDIOGRAM TRACING: CPT

## 2019-04-01 PROCEDURE — 97110 THERAPEUTIC EXERCISES: CPT

## 2019-04-01 PROCEDURE — 99232 SBSQ HOSP IP/OBS MODERATE 35: CPT

## 2019-04-01 PROCEDURE — 97530 THERAPEUTIC ACTIVITIES: CPT

## 2019-04-01 PROCEDURE — C1713: CPT

## 2019-04-01 PROCEDURE — 73502 X-RAY EXAM HIP UNI 2-3 VIEWS: CPT

## 2019-04-01 PROCEDURE — 97535 SELF CARE MNGMENT TRAINING: CPT

## 2019-04-01 PROCEDURE — 80048 BASIC METABOLIC PNL TOTAL CA: CPT

## 2019-04-01 PROCEDURE — 36415 COLL VENOUS BLD VENIPUNCTURE: CPT

## 2019-04-01 PROCEDURE — 97116 GAIT TRAINING THERAPY: CPT

## 2019-04-01 PROCEDURE — 97167 OT EVAL HIGH COMPLEX 60 MIN: CPT

## 2019-04-01 RX ORDER — TRAMADOL HYDROCHLORIDE 50 MG/1
1 TABLET ORAL
Qty: 40 | Refills: 0 | OUTPATIENT
Start: 2019-04-01

## 2019-04-01 RX ADMIN — CELECOXIB 200 MILLIGRAM(S): 200 CAPSULE ORAL at 09:33

## 2019-04-01 RX ADMIN — ENOXAPARIN SODIUM 40 MILLIGRAM(S): 100 INJECTION SUBCUTANEOUS at 06:03

## 2019-04-01 RX ADMIN — CELECOXIB 200 MILLIGRAM(S): 200 CAPSULE ORAL at 08:33

## 2019-04-01 RX ADMIN — Medication 100 MILLIGRAM(S): at 08:34

## 2019-04-01 RX ADMIN — Medication 975 MILLIGRAM(S): at 09:33

## 2019-04-01 RX ADMIN — Medication 975 MILLIGRAM(S): at 08:33

## 2019-04-01 RX ADMIN — DORZOLAMIDE HYDROCHLORIDE 2 DROP(S): 20 SOLUTION/ DROPS OPHTHALMIC at 08:33

## 2019-04-01 NOTE — PROGRESS NOTE ADULT - SUBJECTIVE AND OBJECTIVE BOX
ROB LINDSEY    [id number]    63y      Female    CC: Left hip pain s/p Lt SUSAN POD#3  Pain is well controlled, feels much better than yesterday, had a small BM.     INTERVAL HPI/OVERNIGHT EVENTS: no acute events    REVIEW OF SYSTEMS:    CONSTITUTIONAL: No fever, or fatigue  RESPIRATORY: No cough, wheezing, ; No shortness of breath  CARDIOVASCULAR: No chest pain, palpitations  GASTROINTESTINAL: No abdominal or epigastric pain. No nausea, vomiting      Vital Signs Last 24 Hrs  T(C): 36.7 (01 Apr 2019 07:40), Max: 36.7 (31 Mar 2019 16:01)  T(F): 98 (01 Apr 2019 07:40), Max: 98 (31 Mar 2019 16:01)  HR: 61 (01 Apr 2019 07:40) (59 - 67)  BP: 122/65 (01 Apr 2019 07:40) (99/60 - 122/65)  BP(mean): --  RR: 18 (01 Apr 2019 07:40) (18 - 18)  SpO2: 96% (01 Apr 2019 07:40) (96% - 100%)    PHYSICAL EXAM:    GENERAL: NAD, well-groomed  HEENT: PERRL, +EOMI  NECK: soft, Supple  CHEST/LUNG: Clear to percussion bilaterally; No wheezing  HEART: S1S2+, Regular rate and rhythm; No murmurs  ABDOMEN: Soft, Nontender, Nondistended; Bowel sounds present  EXTREMITIES:  No clubbing, cyanosis, or edema  SKIN: No rashes or lesions  NEURO: AAOX3,   PSYCH: normal mood      03-29 @ 07:01  -  03-30 @ 07:00  --------------------------------------------------------  IN: 2640 mL / OUT: 650 mL / NET: 1990 mL        LABS:                        10.4   11.9  )-----------( 257      ( 30 Mar 2019 06:36 )             31.2     03-30    139  |  104  |  9.0  ----------------------------<  124<H>  4.3   |  25.0  |  0.59    Ca    8.4<L>      30 Mar 2019 06:36            RADIOLOGY & ADDITIONAL TESTS:

## 2019-04-01 NOTE — PROGRESS NOTE ADULT - SUBJECTIVE AND OBJECTIVE BOX
ROB LINDSEY    [id number]    History: Patient is status post left anterior total hip arthroplasty on left SUSAN, POD #3. Patient is doing well and is comfortable. The patient's pain is controlled using the prescribed pain medications. The patient is participating in physical therapy. Denies nausea, vomiting, chest pain, shortness of breath, abdominal pain or fever. No new complaints.      Vital Signs Last 24 Hrs  T(C): 36.4 (31 Mar 2019 23:22), Max: 36.7 (31 Mar 2019 16:01)  T(F): 97.6 (31 Mar 2019 23:22), Max: 98 (31 Mar 2019 16:01)  HR: 59 (31 Mar 2019 23:22) (59 - 67)  BP: 101/65 (31 Mar 2019 23:22) (99/60 - 114/68)  BP(mean): --  RR: 18 (31 Mar 2019 23:22) (18 - 18)  SpO2: 100% (31 Mar 2019 23:22) (97% - 100%)      Physical exam: The left hip dressing is clean, dry and intact. No drainage or discharge. No erythema is noted. No blistering. No ecchymosis. The calf is supple nontender.  No calf tenderness. Sensation to light touch is grossly intact distally. The lateral cutaneous nerve is intact. Motor function distally is 5/5. No foot drop. 2+ dorsalis pedis pulse. Capillary refill is less than 2 seconds. No cyanosis.    Primary Orthopedic Assessment:  • s/p LEFT ANTERIOR total hip replacement pod #3    Plan:   • DVT prophylaxis lovenox, including use of compression devices and ankle pumps  •  Continue physical therapy  • Weightbearing as tolerated of the left lower extremity with assistance of a walker  • Incentive spirometry encouraged  • Pain control as clinically indicated  • Anterior hip precautions reviewed with patient  • Discharge planning – anticipated discharge is Home today

## 2019-04-01 NOTE — PROGRESS NOTE ADULT - ASSESSMENT
64 y/o female with PMH borderline DM, seasonal asthma, glaucoma presents with progressive left hip pain, now s/p Left SUSAN     Problem/Recommendation - 1:  Problem: Unilateral primary osteoarthritis, left hip. Recommendation: S/p Left SUSAN  Pain control.  PT/OT.  Antibiotics, wound care, and DVT prophylaxis as per Ortho.  Incentive spirometry.  Avoid opioid induced constipation.     Problem/Recommendation - 2:  ·  Problem: Hyperlipidemia, unspecified hyperlipidemia type.  Recommendation: Continue statin.      Problem/Recommendation - 3:  ·  Problem: Glaucoma.  Recommendation: hx of , b/l -Continue home medications.     Problem/Recommendation - 4:    Acute blood loss anemia - 2/2 surgical blood loss - HD stable,.  CBC stable     Problem/Recommendation - 5:   Leucocytosis - likely reactive - improved    Pre-diabetes - Diet changes , fu with PMD    Medically stable for discharge. No new changes to home medications for chronic medical illnesses recommended.

## 2019-04-01 NOTE — DISCHARGE NOTE NURSING/CASE MANAGEMENT/SOCIAL WORK - NSDCDPATPORTLINK_GEN_ALL_CORE
You can access the [facility] Portal, offered by [facility], by registering with the following website: [email or web address]

## 2019-04-15 ENCOUNTER — OTHER (OUTPATIENT)
Age: 64
End: 2019-04-15

## 2019-04-15 ENCOUNTER — APPOINTMENT (OUTPATIENT)
Dept: ORTHOPEDIC SURGERY | Facility: CLINIC | Age: 64
End: 2019-04-15
Payer: MEDICARE

## 2019-04-15 VITALS
WEIGHT: 140 LBS | HEART RATE: 77 BPM | BODY MASS INDEX: 23.32 KG/M2 | SYSTOLIC BLOOD PRESSURE: 111 MMHG | DIASTOLIC BLOOD PRESSURE: 72 MMHG | HEIGHT: 65 IN

## 2019-04-15 PROCEDURE — 73502 X-RAY EXAM HIP UNI 2-3 VIEWS: CPT

## 2019-04-15 PROCEDURE — 99024 POSTOP FOLLOW-UP VISIT: CPT

## 2019-04-15 NOTE — HISTORY OF PRESENT ILLNESS
[Doing Well] : is doing well [Excellent Pain Control] : has excellent pain control [No Sign of Infection] : is showing no signs of infection [Chills] : no chills [Constipation] : no constipation [Diarrhea] : no diarrhea [Dysuria] : no dysuria [Fever] : no fever [Nausea] : no nausea [Vomiting] : no vomiting [Erythema] : not erythematous [Discharge] : absent of discharge [Dehiscence] : not dehisced [de-identified] : The patient is a 63 year old female 17 days s/p left anterior THR. She is ambulating and transferring well with a cane. She has concluded home physical therapy. For DVT prophylaxis she is on Lovenox injections. She is on Celebrex for prevention of heterotopic ossification. Pain is controlled well with Tylenol and Tramadol. She denies systemic symptoms of fever or chills. She denies drainage from the incision site.  [de-identified] : S/P Left DAA THR, DOS: 3/29/19. [de-identified] : Exam of the left hip shows a well healing incision. Weakness with SLR, smooth hip ROM. 5/5 motor strength bilaterally distally. Sensation intact distally.  [de-identified] : Xray- AP pelvis and 2 views of the left hip shows a hip replacement in stable position, without sign of fracture or dislocation.  [de-identified] : The patient is doing very well 17 days after left anterior total hip replacement. The patient will be transitioned to outpatient physical therapy and a prescription was given for that. She will finish her course of Celebrex for prevention of heterotopic ossification. The patient will complete Lovenox for DVT prophylaxis for 2  more weeks. Anterior hip precautions were reinforced. Overall the patient is very happy with their outcome so far. Followup in 4 weeks with repeat x-rays.

## 2019-04-15 NOTE — ADDENDUM
[FreeTextEntry1] : This note was authored by Ezekiel Gonzalez working as a medical scribe for Dr. Endy Young. The note was reviewed, edited, and revised by Dr. Endy Young whom is in agreement with the exam findings, imaging findings, and treatment plan. 04/15/2019.

## 2019-04-18 ENCOUNTER — OTHER (OUTPATIENT)
Age: 64
End: 2019-04-18

## 2019-04-22 ENCOUNTER — OTHER (OUTPATIENT)
Age: 64
End: 2019-04-22

## 2019-05-06 ENCOUNTER — OTHER (OUTPATIENT)
Age: 64
End: 2019-05-06

## 2019-05-13 ENCOUNTER — APPOINTMENT (OUTPATIENT)
Dept: ORTHOPEDIC SURGERY | Facility: CLINIC | Age: 64
End: 2019-05-13
Payer: MEDICARE

## 2019-05-13 ENCOUNTER — OTHER (OUTPATIENT)
Age: 64
End: 2019-05-13

## 2019-05-13 VITALS
BODY MASS INDEX: 23.32 KG/M2 | SYSTOLIC BLOOD PRESSURE: 113 MMHG | DIASTOLIC BLOOD PRESSURE: 57 MMHG | WEIGHT: 140 LBS | HEIGHT: 65 IN | HEART RATE: 69 BPM

## 2019-05-13 DIAGNOSIS — Z96.642 AFTERCARE FOLLOWING JOINT REPLACEMENT SURGERY: ICD-10-CM

## 2019-05-13 DIAGNOSIS — Z96.649 PRESENCE OF UNSPECIFIED ARTIFICIAL HIP JOINT: ICD-10-CM

## 2019-05-13 DIAGNOSIS — Z47.1 AFTERCARE FOLLOWING JOINT REPLACEMENT SURGERY: ICD-10-CM

## 2019-05-13 PROCEDURE — 99024 POSTOP FOLLOW-UP VISIT: CPT

## 2019-05-13 PROCEDURE — 73502 X-RAY EXAM HIP UNI 2-3 VIEWS: CPT | Mod: TC,LT

## 2019-05-13 NOTE — HISTORY OF PRESENT ILLNESS
[de-identified] : S/P Left DAA THR, DOS: 3/29/19.  [de-identified] : This 63-year-old female presents for followup of her left anterior approach THR. She is now 6-1/2 weeks postop. She has discontinued Lovenox for DVT prophylaxis. She has concluded Celebrex for prevention of heterotopic ossification. She notes no significant complaints of pain. She reports an occasional fleeting pain that does not require pain medication. She denies systemic symptoms of fever or chills. She has been continuing with physical therapy. She is transferring and ambulating with a cane, a mild antalgic gait persists. She is working on reciprocating stairs. [de-identified] : The left hip is without erythema, abrasions, or ecchymosis. The incision is healed. There are no signs or symptoms of infection. No pain with palpation of the hip. Functional range of smooth, pain-free, passive range of motion. Although improvement is noted with straight leg raising, it remains weak in comparison to the contralateral side. No lower extremity swelling. Calf is nontender and Homans test is negative. [de-identified] : X-ray-AP of the pelvis and 2 views of the left hip show the prosthesis to remain in satisfactory alignment. There is no evidence of fracture or dislocation. [de-identified] : This is a 63-year-old female 6-1/2 weeks status post left anterior approach THR. Incision is healed. She has adequate pain control. [de-identified] : The patient is 6 1/2 weeks following anterior total hip replacement.  Anterior hip precautions were reviewed and recommended to be followed for another 6 weeks.  The patient will continue outpatient physical therapy and gradually transition to a home exercise program over the next 6 weeks.  Dental prophylaxis was reviewed.  The patient is doing well so far and overall happy with her progress.  Followup in 6 weeks.

## 2019-05-14 PROBLEM — J45.998 OTHER ASTHMA: Chronic | Status: ACTIVE | Noted: 2019-03-11

## 2019-05-14 PROBLEM — H40.9 UNSPECIFIED GLAUCOMA: Chronic | Status: ACTIVE | Noted: 2019-03-11

## 2019-05-14 PROBLEM — R73.03 PREDIABETES: Chronic | Status: ACTIVE | Noted: 2019-03-11

## 2019-06-18 ENCOUNTER — OTHER (OUTPATIENT)
Age: 64
End: 2019-06-18

## 2019-06-27 ENCOUNTER — FORM ENCOUNTER (OUTPATIENT)
Age: 64
End: 2019-06-27

## 2019-06-27 ENCOUNTER — APPOINTMENT (OUTPATIENT)
Dept: ORTHOPEDIC SURGERY | Facility: CLINIC | Age: 64
End: 2019-06-27
Payer: MEDICARE

## 2019-06-27 VITALS
WEIGHT: 140 LBS | HEART RATE: 72 BPM | BODY MASS INDEX: 23.32 KG/M2 | SYSTOLIC BLOOD PRESSURE: 118 MMHG | DIASTOLIC BLOOD PRESSURE: 66 MMHG | HEIGHT: 65 IN

## 2019-06-27 PROCEDURE — 99024 POSTOP FOLLOW-UP VISIT: CPT

## 2019-06-27 PROCEDURE — 73502 X-RAY EXAM HIP UNI 2-3 VIEWS: CPT

## 2019-06-27 NOTE — HISTORY OF PRESENT ILLNESS
[___ Months Post Op] : [unfilled] months post op [Doing Well] : is doing well [Excellent Pain Control] : has excellent pain control [No Sign of Infection] : is showing no signs of infection [Chills] : no chills [Diarrhea] : no diarrhea [Constipation] : no constipation [Fever] : no fever [Dysuria] : no dysuria [Nausea] : no nausea [Vomiting] : no vomiting [de-identified] : The patient is a 63 year old female 3 months s/p left anterior THR. She is ambulating and transferring well. She reports continuing physical therapy with good improvement of strength. She reports walking frequently without pain or stiffness. Overall, she is very happy with the results of the surgery.  [de-identified] : Exam of the left hip shows a well healed incision.  SLR without difficulty, hip flexion of 110 degrees, external rotation of 50 degrees, internal rotation of 20 degrees. 5/5 motor strength bilaterally distally. Sensation intact distally. LFCN intact.  [de-identified] : S/P Left DAA THR, DOS: 3/29/19.  [de-identified] : Xray- AP pelvis and 2 views of the left hip shows a hip replacement in stable position, without sign of fracture or dislocation.  [de-identified] : The patient is a 63 year old female 3 months s/p left anterior THR. She will finish physical therapy and transition to home exercises. Overall, she is very happy with the results of the surgery. Dental prophylaxis was advised. Follow up one year post op for radiographic surveillance.

## 2019-06-27 NOTE — ADDENDUM
[FreeTextEntry1] : This note was authored by Ezekiel Gonzalez working as a medical scribe for Dr. Endy Yougn. The note was reviewed, edited, and revised by Dr. Endy Young whom is in agreement with the exam findings, imaging findings, and treatment plan. 06/27/2019.

## 2019-08-30 NOTE — PATIENT PROFILE ADULT - NSPROGENARRIVEDFROM_GEN_A_NUR
63F, hx of afib on warfarin, HTN, HLD, reported hx of right cerebellar stroke presents to ED as stroke code from stress test. Per charting and Neuro resident, patient was about to begin a stress test when she had slight change in mental status, as well as right sided weakness. Per Neuro resident, patient had weakness to right upper/lower ext which improved after a few minutes. patient was awake, alert, oriented, no other obvious focal neuro deficits. Patient went to CT and then came to ED for further care and eval. On arrival, patient awake, alert, oriented x4. Patient is Creole speaking. Patient declined  services and requested family translate at bedside. Patient currently denies any sx: denies headache, dizziness, lightheadedness, blurry vision, chest pain, shortness of breath, cough, abdominal pain, numbness or weakness to specific extremities.
home

## 2020-03-13 ENCOUNTER — APPOINTMENT (OUTPATIENT)
Dept: ORTHOPEDIC SURGERY | Facility: CLINIC | Age: 65
End: 2020-03-13
Payer: MEDICARE

## 2020-03-13 VITALS
DIASTOLIC BLOOD PRESSURE: 75 MMHG | WEIGHT: 140 LBS | HEIGHT: 65 IN | SYSTOLIC BLOOD PRESSURE: 121 MMHG | BODY MASS INDEX: 23.32 KG/M2 | HEART RATE: 74 BPM

## 2020-03-13 DIAGNOSIS — Z96.642 PRESENCE OF LEFT ARTIFICIAL HIP JOINT: ICD-10-CM

## 2020-03-13 PROCEDURE — 73502 X-RAY EXAM HIP UNI 2-3 VIEWS: CPT | Mod: LT

## 2020-03-13 PROCEDURE — 99213 OFFICE O/P EST LOW 20 MIN: CPT

## 2020-03-13 NOTE — DISCUSSION/SUMMARY
[de-identified] : The patient is a 64 year old female 1 year s/p left anterior THR.  She has some mild weakness with straight leg raise and I think she might benefit from some physical therapy for that.  She was given a prescription today.  Overall, she is very happy with the results of the surgery. Dental prophylaxis was advised.  She was advised to follow-up in about 2 years for surveillance x-rays

## 2020-03-13 NOTE — ADDENDUM
[FreeTextEntry1] : This note was authored by Ezekiel Gonzalez working as a medical scribe for Dr. Endy Young. The note was reviewed, edited, and revised by Dr. Endy Young whom is in agreement with the exam findings, imaging findings, and treatment plan. 03/13/2020.

## 2020-03-13 NOTE — PHYSICAL EXAM
[de-identified] : The patient appears well nourished  and in no apparent distress.  The patient is alert and oriented to person, place, and time.   Affect and mood appear normal. The head is normocephalic and atraumatic.  The eyes reveal normal sclera and extra ocular muscles are intact. The tongue is midline with no apparent lesions.  Skin shows normal turgor with no evidence of eczema or psoriasis.  No respiratory distress noted.  Sensation grossly intact.\par   [de-identified] : Exam of the left hip shows a well healed incision. SLR with mild weakness compared to the right leg, hip external rotation of 50 degrees, internal rotation of 30 degrees, hip flexion of 100 degrees. 5/5 motor strength bilaterally distally. Sensation intact distally. LFCN intact.  [de-identified] : Xray- AP pelvis and 2 views of the left hip shows a hip replacement in stable position, without sign of fracture or dislocation.

## 2020-03-13 NOTE — REASON FOR VISIT
[Follow-Up Visit] : a follow-up visit for [Other: ____] : [unfilled] [FreeTextEntry2] : S/P Left DAA THR, DOS: 3/29/19.

## 2020-04-09 PROBLEM — M25.559 HIP PAIN: Status: ACTIVE | Noted: 2019-01-17

## 2020-09-03 ENCOUNTER — APPOINTMENT (OUTPATIENT)
Dept: OBGYN | Facility: CLINIC | Age: 65
End: 2020-09-03
Payer: MEDICARE

## 2020-09-03 VITALS
DIASTOLIC BLOOD PRESSURE: 72 MMHG | BODY MASS INDEX: 23.32 KG/M2 | HEIGHT: 65 IN | WEIGHT: 140 LBS | TEMPERATURE: 97.8 F | SYSTOLIC BLOOD PRESSURE: 110 MMHG

## 2020-09-03 DIAGNOSIS — Z12.31 ENCOUNTER FOR SCREENING MAMMOGRAM FOR MALIGNANT NEOPLASM OF BREAST: ICD-10-CM

## 2020-09-03 DIAGNOSIS — R92.2 INCONCLUSIVE MAMMOGRAM: ICD-10-CM

## 2020-09-03 PROCEDURE — G0101: CPT

## 2020-09-03 RX ORDER — TRAMADOL HYDROCHLORIDE 25 MG/1
TABLET, COATED ORAL
Refills: 0 | Status: COMPLETED | COMMUNITY
End: 2020-09-03

## 2020-09-03 RX ORDER — ENOXAPARIN SODIUM 40 MG/.4ML
40 INJECTION SUBCUTANEOUS
Refills: 0 | Status: COMPLETED | COMMUNITY
End: 2020-09-03

## 2020-09-03 NOTE — PHYSICAL EXAM
[Awake] : awake [Alert] : alert [Thyroid Nodule] : no thyroid nodule [Acute Distress] : no acute distress [LAD] : no lymphadenopathy [Mass] : no breast mass [Goiter] : no goiter [Axillary LAD] : no axillary lymphadenopathy [Nipple Discharge] : no nipple discharge [Distended] : not distended [Soft] : soft [Tender] : non tender [Oriented x3] : oriented to person, place, and time [H/Smegaly] : no hepatosplenomegaly [Depressed Mood] : not depressed [Flat Affect] : affect not flat [Normal] : uterus [No Bleeding] : there was no active vaginal bleeding [Uterine Adnexae] : were not tender and not enlarged

## 2020-09-07 LAB — HPV HIGH+LOW RISK DNA PNL CVX: NOT DETECTED

## 2020-09-24 ENCOUNTER — APPOINTMENT (OUTPATIENT)
Dept: OBGYN | Facility: CLINIC | Age: 65
End: 2020-09-24

## 2020-11-17 ENCOUNTER — NON-APPOINTMENT (OUTPATIENT)
Age: 65
End: 2020-11-17

## 2020-11-19 ENCOUNTER — APPOINTMENT (OUTPATIENT)
Dept: RADIOLOGY | Facility: CLINIC | Age: 65
End: 2020-11-19

## 2020-12-11 NOTE — PATIENT PROFILE ADULT - FUNCTIONAL SCREEN CURRENT LEVEL: EATING, MLM
0 = independent Clindamycin Pregnancy And Lactation Text: This medication can be used in pregnancy if certain situations. Clindamycin is also present in breast milk.

## 2021-01-14 ENCOUNTER — APPOINTMENT (OUTPATIENT)
Dept: MAMMOGRAPHY | Facility: CLINIC | Age: 66
End: 2021-01-14
Payer: MEDICARE

## 2021-01-14 ENCOUNTER — OUTPATIENT (OUTPATIENT)
Dept: OUTPATIENT SERVICES | Facility: HOSPITAL | Age: 66
LOS: 1 days | End: 2021-01-14
Payer: MEDICARE

## 2021-01-14 ENCOUNTER — RESULT REVIEW (OUTPATIENT)
Age: 66
End: 2021-01-14

## 2021-01-14 ENCOUNTER — APPOINTMENT (OUTPATIENT)
Dept: ULTRASOUND IMAGING | Facility: CLINIC | Age: 66
End: 2021-01-14
Payer: MEDICARE

## 2021-01-14 ENCOUNTER — APPOINTMENT (OUTPATIENT)
Dept: RADIOLOGY | Facility: CLINIC | Age: 66
End: 2021-01-14
Payer: MEDICARE

## 2021-01-14 DIAGNOSIS — Z98.890 OTHER SPECIFIED POSTPROCEDURAL STATES: Chronic | ICD-10-CM

## 2021-01-14 DIAGNOSIS — R92.8 OTHER ABNORMAL AND INCONCLUSIVE FINDINGS ON DIAGNOSTIC IMAGING OF BREAST: ICD-10-CM

## 2021-01-14 PROCEDURE — 76641 ULTRASOUND BREAST COMPLETE: CPT

## 2021-01-14 PROCEDURE — 76641 ULTRASOUND BREAST COMPLETE: CPT | Mod: 26,50

## 2021-01-14 PROCEDURE — 77067 SCR MAMMO BI INCL CAD: CPT | Mod: 26

## 2021-01-14 PROCEDURE — 77063 BREAST TOMOSYNTHESIS BI: CPT | Mod: 26

## 2021-01-14 PROCEDURE — 77067 SCR MAMMO BI INCL CAD: CPT

## 2021-01-14 PROCEDURE — 77063 BREAST TOMOSYNTHESIS BI: CPT

## 2021-07-09 NOTE — HISTORY OF PRESENT ILLNESS
[de-identified] : The patient is a 64 year old female who presents for followup of her left total hip replacement. She is now one year postop. She is noting no complaints of pain. She is functioning in all activities of daily living. She admits that she has not been performing regular exercise.
None known
room air

## 2021-10-07 ENCOUNTER — RESULT REVIEW (OUTPATIENT)
Age: 66
End: 2021-10-07

## 2022-03-27 ENCOUNTER — EMERGENCY (EMERGENCY)
Facility: HOSPITAL | Age: 67
LOS: 1 days | Discharge: HOME | End: 2022-03-27
Attending: EMERGENCY MEDICINE
Payer: MEDICARE

## 2022-03-27 VITALS
RESPIRATION RATE: 16 BRPM | TEMPERATURE: 98 F | DIASTOLIC BLOOD PRESSURE: 61 MMHG | OXYGEN SATURATION: 98 % | HEART RATE: 72 BPM | SYSTOLIC BLOOD PRESSURE: 147 MMHG | HEIGHT: 65 IN | WEIGHT: 139.99 LBS

## 2022-03-27 DIAGNOSIS — Z98.890 OTHER SPECIFIED POSTPROCEDURAL STATES: Chronic | ICD-10-CM

## 2022-03-27 LAB
ALBUMIN SERPL ELPH-MCNC: 4.5 G/DL — SIGNIFICANT CHANGE UP (ref 3.3–5.2)
ALP SERPL-CCNC: 89 U/L — SIGNIFICANT CHANGE UP (ref 40–120)
ALT FLD-CCNC: 19 U/L — SIGNIFICANT CHANGE UP
ANION GAP SERPL CALC-SCNC: 14 MMOL/L — SIGNIFICANT CHANGE UP (ref 5–17)
AST SERPL-CCNC: 23 U/L — SIGNIFICANT CHANGE UP
BASOPHILS # BLD AUTO: 0.05 K/UL — SIGNIFICANT CHANGE UP (ref 0–0.2)
BASOPHILS NFR BLD AUTO: 0.5 % — SIGNIFICANT CHANGE UP (ref 0–2)
BILIRUB SERPL-MCNC: 0.5 MG/DL — SIGNIFICANT CHANGE UP (ref 0.4–2)
BUN SERPL-MCNC: 12.2 MG/DL — SIGNIFICANT CHANGE UP (ref 8–20)
CALCIUM SERPL-MCNC: 9.1 MG/DL — SIGNIFICANT CHANGE UP (ref 8.6–10.2)
CHLORIDE SERPL-SCNC: 101 MMOL/L — SIGNIFICANT CHANGE UP (ref 98–107)
CO2 SERPL-SCNC: 24 MMOL/L — SIGNIFICANT CHANGE UP (ref 22–29)
CREAT SERPL-MCNC: 0.86 MG/DL — SIGNIFICANT CHANGE UP (ref 0.5–1.3)
EGFR: 74 ML/MIN/1.73M2 — SIGNIFICANT CHANGE UP
EOSINOPHIL # BLD AUTO: 0.11 K/UL — SIGNIFICANT CHANGE UP (ref 0–0.5)
EOSINOPHIL NFR BLD AUTO: 1.2 % — SIGNIFICANT CHANGE UP (ref 0–6)
GLUCOSE SERPL-MCNC: 175 MG/DL — HIGH (ref 70–99)
HCT VFR BLD CALC: 40.8 % — SIGNIFICANT CHANGE UP (ref 34.5–45)
HGB BLD-MCNC: 13.7 G/DL — SIGNIFICANT CHANGE UP (ref 11.5–15.5)
IMM GRANULOCYTES NFR BLD AUTO: 0.3 % — SIGNIFICANT CHANGE UP (ref 0–1.5)
LYMPHOCYTES # BLD AUTO: 2.79 K/UL — SIGNIFICANT CHANGE UP (ref 1–3.3)
LYMPHOCYTES # BLD AUTO: 29.8 % — SIGNIFICANT CHANGE UP (ref 13–44)
MCHC RBC-ENTMCNC: 31.2 PG — SIGNIFICANT CHANGE UP (ref 27–34)
MCHC RBC-ENTMCNC: 33.6 GM/DL — SIGNIFICANT CHANGE UP (ref 32–36)
MCV RBC AUTO: 92.9 FL — SIGNIFICANT CHANGE UP (ref 80–100)
MONOCYTES # BLD AUTO: 0.51 K/UL — SIGNIFICANT CHANGE UP (ref 0–0.9)
MONOCYTES NFR BLD AUTO: 5.5 % — SIGNIFICANT CHANGE UP (ref 2–14)
NEUTROPHILS # BLD AUTO: 5.86 K/UL — SIGNIFICANT CHANGE UP (ref 1.8–7.4)
NEUTROPHILS NFR BLD AUTO: 62.7 % — SIGNIFICANT CHANGE UP (ref 43–77)
PLATELET # BLD AUTO: 283 K/UL — SIGNIFICANT CHANGE UP (ref 150–400)
POTASSIUM SERPL-MCNC: 3.8 MMOL/L — SIGNIFICANT CHANGE UP (ref 3.5–5.3)
POTASSIUM SERPL-SCNC: 3.8 MMOL/L — SIGNIFICANT CHANGE UP (ref 3.5–5.3)
PROT SERPL-MCNC: 7.1 G/DL — SIGNIFICANT CHANGE UP (ref 6.6–8.7)
RBC # BLD: 4.39 M/UL — SIGNIFICANT CHANGE UP (ref 3.8–5.2)
RBC # FLD: 11.9 % — SIGNIFICANT CHANGE UP (ref 10.3–14.5)
SODIUM SERPL-SCNC: 139 MMOL/L — SIGNIFICANT CHANGE UP (ref 135–145)
TROPONIN T SERPL-MCNC: <0.01 NG/ML — SIGNIFICANT CHANGE UP (ref 0–0.06)
WBC # BLD: 9.35 K/UL — SIGNIFICANT CHANGE UP (ref 3.8–10.5)
WBC # FLD AUTO: 9.35 K/UL — SIGNIFICANT CHANGE UP (ref 3.8–10.5)

## 2022-03-27 PROCEDURE — 71045 X-RAY EXAM CHEST 1 VIEW: CPT

## 2022-03-27 PROCEDURE — 85025 COMPLETE CBC W/AUTO DIFF WBC: CPT

## 2022-03-27 PROCEDURE — 84484 ASSAY OF TROPONIN QUANT: CPT

## 2022-03-27 PROCEDURE — 93005 ELECTROCARDIOGRAM TRACING: CPT

## 2022-03-27 PROCEDURE — 99284 EMERGENCY DEPT VISIT MOD MDM: CPT

## 2022-03-27 PROCEDURE — 71045 X-RAY EXAM CHEST 1 VIEW: CPT | Mod: 26

## 2022-03-27 PROCEDURE — 80053 COMPREHEN METABOLIC PANEL: CPT

## 2022-03-27 PROCEDURE — 93010 ELECTROCARDIOGRAM REPORT: CPT

## 2022-03-27 PROCEDURE — 36415 COLL VENOUS BLD VENIPUNCTURE: CPT

## 2022-03-27 PROCEDURE — 99283 EMERGENCY DEPT VISIT LOW MDM: CPT | Mod: 25

## 2022-03-27 NOTE — ED ADULT NURSE NOTE - OBJECTIVE STATEMENT
Pt c/o intermittent cp x 2 days.  Pt sent from urgent care for abnormal EKG.  Denies cardiac hx, sob.

## 2022-03-27 NOTE — ED PROVIDER NOTE - OBJECTIVE STATEMENT
Pt. with hx of HLD and osteopenia present to the ED due to two separate episode of chest pain. Pt. had first episode yesterday. She describes it as a "pinching" sensation that lasted a few minutes yesterday. Then it went away. Pt. had another episode of chest pain today at 10am. Pt. said that it was "very light" and lasted 5-10 minutes. Pt. went to urgent care and was told to go to the ED due to an abnormal EKG.  EKG in our ED does not show any ST elevation or depression or ischemic changes or AV blocks. Pt. is chest pain free and has been CP free for over 5 hours.

## 2022-03-27 NOTE — ED PROVIDER NOTE - CLINICAL SUMMARY MEDICAL DECISION MAKING FREE TEXT BOX
Pt. with two episodes of chest pain since yesterday. CP very light with no SOB/diaphoresis. Pt. is chest pain free now.

## 2022-03-27 NOTE — ED PROVIDER NOTE - ATTENDING CONTRIBUTION TO CARE
Pt. awake and alert. NO acute distress. Lungs-CTA b/l Heart-RRR. I, Dr. Mac, performed a face to face bedside interview with this patient regarding history of present illness, review of symptoms and relevant past medical, social and family history.  I completed an independent physical examination.  I have also reviewed the ACP's note(s) and discussed the plan with the ACP.

## 2022-03-27 NOTE — ED PROVIDER NOTE - PATIENT PORTAL LINK FT
You can access the FollowMyHealth Patient Portal offered by [facility] by registering at the following website: [email or web address]. By joining [facility]’s FollowMyHealth portal, you will also be able to view your health information using other applications (apps) compatible with our system.

## 2022-03-27 NOTE — ED PROVIDER NOTE - NSICDXFAMILYHX_GEN_ALL_CORE_FT
FAMILY HISTORY:  Father  Still living? No  Family history of CHF (congestive heart failure), Age at diagnosis: Age Unknown

## 2022-03-27 NOTE — ED PROVIDER NOTE - PROGRESS NOTE DETAILS
PT. remains chest pain free. Pt. will follow up with her cardiologist-Dr. Prabhakar. Labs results discussed with patient.

## 2022-06-02 ENCOUNTER — APPOINTMENT (OUTPATIENT)
Dept: ORTHOPEDIC SURGERY | Facility: CLINIC | Age: 67
End: 2022-06-02
Payer: MEDICARE

## 2022-06-02 VITALS
HEART RATE: 67 BPM | HEIGHT: 65 IN | WEIGHT: 140 LBS | DIASTOLIC BLOOD PRESSURE: 94 MMHG | SYSTOLIC BLOOD PRESSURE: 145 MMHG | BODY MASS INDEX: 23.32 KG/M2

## 2022-06-02 DIAGNOSIS — M25.561 PAIN IN RIGHT KNEE: ICD-10-CM

## 2022-06-02 PROCEDURE — 73564 X-RAY EXAM KNEE 4 OR MORE: CPT | Mod: RT

## 2022-06-02 PROCEDURE — 99214 OFFICE O/P EST MOD 30 MIN: CPT | Mod: 25

## 2022-06-02 PROCEDURE — 20610 DRAIN/INJ JOINT/BURSA W/O US: CPT | Mod: RT

## 2022-06-02 RX ORDER — SODIUM HYALURONATE INTRA-ARTICULAR SOLN PREF SYR 25 MG/2.5ML 25/2.5 MG/ML
25 SOLUTION PREFILLED SYRINGE INTRAARTICULAR
Qty: 5 | Refills: 0 | Status: ACTIVE | OUTPATIENT
Start: 2022-06-02

## 2022-06-03 NOTE — PHYSICAL EXAM
[de-identified] : The patient appears well nourished  and in no apparent distress.  The patient is alert and oriented to person, place, and time.   Affect and mood appear normal. The head is normocephalic and atraumatic.  The eyes reveal normal sclera and extra ocular muscles are intact. The tongue is midline with no apparent lesions.  Skin shows normal turgor with no evidence of eczema or psoriasis.  No respiratory distress noted.  Sensation grossly intact.		  [de-identified] : Exam of the right knee shows  -7 to 120 degrees of flexion measured with a goniometer. There is patellofemoral crepitance. There is an effusion. \par   5/5 motor strength bilaterally distally. Sensation intact distally.		  [de-identified] : X-ray: 4 views of the right knee demonstrate bone on bone patellofemoral compartment arthritis with osteophytes.

## 2022-06-03 NOTE — ADDENDUM
[FreeTextEntry1] : This note was authored by Tej Santos working as a medical scribe for Dr. Endy Young. The note was reviewed, edited, and revised by Dr. Endy Young whom is in agreement with the exam findings, imaging findings, and treatment plan. 06/02/2022

## 2022-06-03 NOTE — HISTORY OF PRESENT ILLNESS
[de-identified] : Ms. ROB LINDSEY is a 66 year old female presenting for evaluation of 5 days of right knee pain. She is status post left THR on 3/29/19 which is doing well. She states this past sunday 5/29/22 she woke up and felt pain throughout her knee, to the point of feeling unable to bare weight on the knee. She generalizes the anteriorly, medially, and posteriorly. She took advil without improvement. She has not had injections thus far. She presents for initial evaluation of this pain.

## 2022-06-03 NOTE — DISCUSSION/SUMMARY
[de-identified] : ROB LINDSEY is a 66 year female who presents with right knee bone on bone patellofemoral compartment arthritis. While the patient may eventually benefit from right total knee replacement, she wishes to remain nonoperative at this time.  Nonoperative treatment options were discussed including antiinflammatories, physical therapy, intraarticular cortisone injections, and hyaluronic acid gel injections. The patient received an intraarticular cortisone injection in the right knee in the office today.  A series of hyaluronic acid gel injections was ordered for the patient and are pending insurance approval. The office will follow up with the patient when they become available.

## 2022-06-03 NOTE — PROCEDURE
[de-identified] : Using sterile technique, 2cc of depomedrol 40mg/ml, 4cc of 1% plain lidocaine, and 2 cc 0.25% marcaine was drawn up into a sterile syringe. The right knee was then sterilely prepped with chlorhexidine. Ethyl chloride spray was used to anesthetize the skin and subQ tissue. The depomedrol/lidocaine/marcaine mixture was then injected into the knee joint in the anterolateral position. The patient tolerated the procedure well without difficulty. The patient was given instructions on the use of ice and anti-inflammatories post injection site soreness.

## 2022-06-03 NOTE — REASON FOR VISIT
[Follow-Up Visit] : a follow-up visit for [Other: ____] : [unfilled] [FreeTextEntry2] : S/P Left DAA THR, DOS: 3/29/19. \par

## 2022-07-14 ENCOUNTER — APPOINTMENT (OUTPATIENT)
Dept: ORTHOPEDIC SURGERY | Facility: CLINIC | Age: 67
End: 2022-07-14

## 2022-07-14 DIAGNOSIS — M25.562 PAIN IN LEFT KNEE: ICD-10-CM

## 2022-07-14 PROCEDURE — 20610 DRAIN/INJ JOINT/BURSA W/O US: CPT | Mod: 50

## 2022-07-14 PROCEDURE — 73564 X-RAY EXAM KNEE 4 OR MORE: CPT | Mod: LT

## 2022-07-14 PROCEDURE — 99214 OFFICE O/P EST MOD 30 MIN: CPT | Mod: 25

## 2022-07-14 NOTE — REASON FOR VISIT
[Follow-Up Visit] : a follow-up visit for [Other: ____] : [unfilled] [FreeTextEntry2] : Right knee Synvisc inj#1 Lot# [id number] Expires on 2023/04/30. S/P Left DAA THR, DOS: 3/29/19.

## 2022-07-14 NOTE — HISTORY OF PRESENT ILLNESS
[de-identified] : Patient is a 66-year-old female with known history of osteoarthritis of the presenting to begin Synvisc injections.  She recently had a cortisone injection on 6/2/2022 with mild relief at time however now pain is come back.  In addition to this she has new onset left knee pain.  Her left knee pain began a few days ago without fall or trauma.  She generalizes the pain anteriorly and around the kneecap.  She notes the pain is worse with weightbearing to include walking long distance and rising from a seated position.  She has tried therapy and home exercise in the past without relief.  She takes over-the-counter anti-inflammatories without significant improvement.  She has not had injections in this knee thus far.

## 2022-07-14 NOTE — PROCEDURE
[de-identified] : Using sterile technique, 2cc of depomedrol 40mg/ml, 4cc of 1% plain lidocaine, and 2 cc 0.25% marcaine was drawn up into a sterile syringe. The left knee was then sterilely prepped with chlorhexidine. Ethyl chloride spray was used to anesthetize the skin and subQ tissue. The depomedrol/lidocaine/marcaine mixture was then injected into the knee joint in the anterolateral position. The patient tolerated the procedure well without difficulty. The patient was given instructions on the use of ice and anti-inflammatories post injection site soreness. \par \par \par Allergies: The patient denies allergies to medications and has no allergies to chicken,eggs, or feathers.\par Procedure: The patient has been identified by name and date of birth. Patient confirms that we are treating the right knee today.\par The knee was prepped in the usual sterile fashion. The areas were cleansed with chlorhexadine, then sprayed with ethyl chloride. The patient was then injected with the Synvisc into the right knee in the anterolateral position. The patient tolerated the procedure well. The medication was delivered aseptically and atraumatically.\par Diagnosis: Osteoarthritis of the right knee\par Treatment: The patient was advised on the activities for today. I gave the patient instructions on postinjection ice and analgesia.\par

## 2022-07-14 NOTE — DISCUSSION/SUMMARY
[de-identified] : ROB LINDSEY is a 66 year female who presents with right knee bone on bone patellofemoral compartment arthritis and left knee moderate patellofemoral compartment osteoarthritis.  Conservative options were discussed.  She received a steroid injection to the left knee using sterile technique and tolerated well.  She then received a Synvisc injection to the right knee using sterile technique and tolerated well.  She will ice and elevate when home.  Follow-up in 1 week for second Synvisc injection of the right

## 2022-07-14 NOTE — PHYSICAL EXAM
[de-identified] : The patient appears well nourished  and in no apparent distress.  The patient is alert and oriented to person, place, and time.   Affect and mood appear normal. The head is normocephalic and atraumatic.  The eyes reveal normal sclera and extra ocular muscles are intact. The tongue is midline with no apparent lesions.  Skin shows normal turgor with no evidence of eczema or psoriasis.  No respiratory distress noted.  Sensation grossly intact.		  [de-identified] : Exam of the right knee shows  -7 to 120 degrees of flexion measured with a goniometer. There is patellofemoral crepitance. There is an effusion. \par Exam left knee: Skin is within normal limits there is no effusion.  Range of motion 0 to 120 degrees of flexion with pain at deep flexion.  No medial or lateral joint line tenderness.\par   5/5 motor strength bilaterally distally. Sensation intact distally.		  [de-identified] : X-ray 4 view left knee demonstrate moderate patellofemoral compartment osteoarthritis with osteophyte formation.\par \par Prior x-ray: 4 views of the right knee demonstrate bone on bone patellofemoral compartment arthritis with osteophytes.

## 2022-07-21 ENCOUNTER — APPOINTMENT (OUTPATIENT)
Dept: ORTHOPEDIC SURGERY | Facility: CLINIC | Age: 67
End: 2022-07-21

## 2022-07-22 ENCOUNTER — APPOINTMENT (OUTPATIENT)
Dept: ORTHOPEDIC SURGERY | Facility: CLINIC | Age: 67
End: 2022-07-22

## 2022-07-22 VITALS
SYSTOLIC BLOOD PRESSURE: 123 MMHG | HEIGHT: 65 IN | HEART RATE: 67 BPM | WEIGHT: 140 LBS | DIASTOLIC BLOOD PRESSURE: 76 MMHG | BODY MASS INDEX: 23.32 KG/M2

## 2022-07-22 PROCEDURE — 20610 DRAIN/INJ JOINT/BURSA W/O US: CPT | Mod: RT

## 2022-07-22 RX ORDER — HYLAN G-F 20 16MG/2ML
16 SYRINGE (ML) INTRAARTICULAR
Qty: 1 | Refills: 0 | Status: ACTIVE | OUTPATIENT
Start: 2022-07-22

## 2022-07-22 NOTE — HISTORY OF PRESENT ILLNESS
[de-identified] : The patient is here today for a Supartz injection for the right knee. The patient is having osteoarthritic symptoms. \par Allergies: The patient denies allergies to medications and has no allergies to chicken,eggs, or feathers.\par Procedure: The patient has been identified by name and date of birth. Patient confirms that we are treating the right knee today.\par The knee was prepped in the usual sterile fashion. The areas were cleansed with chlorhexadine, then sprayed with ethyl chloride. The patient was then injected with the Euflexxa into the right knee in the anterolateral position. The patient tolerated the procedure well. The medication was delivered aseptically and atraumatically.\par Diagnosis: Osteoarthritis of the right knee\par Treatment: The patient was advised on the activities for today. I gave the patient instructions on postinjection ice and analgesia.\par Follow up in one week.

## 2022-07-22 NOTE — REASON FOR VISIT
[Initial Visit] : an initial visit for [FreeTextEntry2] : Right knee Synvisc #2 Lot: [id number] Exp 2023/04/30

## 2022-07-28 ENCOUNTER — APPOINTMENT (OUTPATIENT)
Dept: ORTHOPEDIC SURGERY | Facility: CLINIC | Age: 67
End: 2022-07-28

## 2022-07-28 VITALS — BODY MASS INDEX: 23.32 KG/M2 | WEIGHT: 140 LBS | HEIGHT: 65 IN

## 2022-07-28 PROCEDURE — 20610 DRAIN/INJ JOINT/BURSA W/O US: CPT | Mod: RT

## 2022-08-03 NOTE — HISTORY OF PRESENT ILLNESS
[de-identified] : The patient is here today for the third Synvisc injection for the right knee. The patient is having osteoarthritic symptoms. \par Allergies: The patient denies allergies to medications and has no allergies to chicken,eggs, or feathers.\par Procedure: The patient has been identified by name and date of birth. Patient confirms that we are treating the right knee today.\par The knee was prepped in the usual sterile fashion. The areas were cleansed with chlorhexadine, then sprayed with ethyl chloride. The patient was then injected with the Synvisc into the right knee in the anterolateral position. The patient tolerated the procedure well. The medication was delivered aseptically and atraumatically.\par Diagnosis: Osteoarthritis of the right knee\par Treatment: The patient was advised on the activities for today. I gave the patient instructions on postinjection ice and analgesia.\par Follow up in 6 weeks.

## 2022-08-03 NOTE — REASON FOR VISIT
[Follow-Up Visit] : a follow-up visit for [Other: ____] : [unfilled] [FreeTextEntry2] : Right knee Synvisc #3 Lot: [id number] Exp 2023/04/30. S/P Left DAA THR, DOS: 3/29/19. \par \par  \par

## 2022-08-18 ENCOUNTER — RX RENEWAL (OUTPATIENT)
Age: 67
End: 2022-08-18

## 2022-08-18 RX ORDER — MELOXICAM 7.5 MG/1
7.5 TABLET ORAL
Qty: 60 | Refills: 0 | Status: ACTIVE | COMMUNITY
Start: 2022-07-14 | End: 1900-01-01

## 2022-09-08 ENCOUNTER — APPOINTMENT (OUTPATIENT)
Dept: ORTHOPEDIC SURGERY | Facility: CLINIC | Age: 67
End: 2022-09-08

## 2022-09-08 VITALS
DIASTOLIC BLOOD PRESSURE: 77 MMHG | BODY MASS INDEX: 23.32 KG/M2 | WEIGHT: 140 LBS | HEIGHT: 65 IN | HEART RATE: 49 BPM | SYSTOLIC BLOOD PRESSURE: 116 MMHG

## 2022-09-08 PROCEDURE — 99213 OFFICE O/P EST LOW 20 MIN: CPT | Mod: 25

## 2022-09-08 PROCEDURE — 20610 DRAIN/INJ JOINT/BURSA W/O US: CPT | Mod: LT

## 2022-09-08 NOTE — DISCUSSION/SUMMARY
[de-identified] : Patient is a 58-year-old female with advanced osteoarthritis of her left knee.  Conservative options were discussed.  She received the first of 3 Synvisc injections to the left knee using sterile technique and tolerated well.  She will ice elevate at home.  Follow-up recommended in one week

## 2022-09-08 NOTE — PROCEDURE
[de-identified] : Allergies: The patient denies allergies to medications and has no allergies to chicken,eggs, or feathers.\par Procedure: The patient has been identified by name and date of birth. Patient confirms that we are treating the left knee today.\par The knee was prepped in the usual sterile fashion. The areas were cleansed with chlorhexadine, then sprayed with ethyl chloride. The patient was then injected with the Synvisc into the left knee in the anterolateral position. The patient tolerated the procedure well. The medication was delivered aseptically and atraumatically.\par Diagnosis: Osteoarthritis of the left knee\par Treatment: The patient was advised on the activities for today. I gave the patient instructions on postinjection ice and analgesia.\par

## 2022-09-08 NOTE — HISTORY OF PRESENT ILLNESS
[de-identified] : Patient is a 66-year-old female presenting for evaluation of left knee osteoarthritis.  She recently completed Synvisc injections to the right knee which is doing much better. She has worsening left knee pain localized medially. In the past she has been treated conservatively thus far. Patient presents to begin Synvisc injections to the left knee.

## 2022-09-08 NOTE — PHYSICAL EXAM
[de-identified] : Multi body exam \par The patient appears well nourished and in no apparent distress. The patient is alert and oriented to person, place, and time. Affect and mood appear normal. The head is normocephalic and atraumatic. The eyes reveal normal sclera and extra ocular muscles are intact. The tongue is midline with no apparent lesions. Skin shows normal turgor with no evidence of eczema or psoriasis. No respiratory distress noted. Sensation grossly intact.\par   [de-identified] : Exam left knee: Skin is within normal limits there is no effusion.  Range of motion is 0 to 115 degrees of flexion with pain at deep flexion. [de-identified] : Prior x-ray 4 views left knee demonstrate osteoarthritis of the medial patellofemoral compartment.

## 2022-09-15 ENCOUNTER — APPOINTMENT (OUTPATIENT)
Dept: GASTROENTEROLOGY | Facility: CLINIC | Age: 67
End: 2022-09-15

## 2022-09-22 ENCOUNTER — APPOINTMENT (OUTPATIENT)
Dept: ORTHOPEDIC SURGERY | Facility: CLINIC | Age: 67
End: 2022-09-22

## 2022-09-22 VITALS — WEIGHT: 140 LBS | HEIGHT: 65 IN | BODY MASS INDEX: 23.32 KG/M2

## 2022-09-22 PROCEDURE — 20610 DRAIN/INJ JOINT/BURSA W/O US: CPT | Mod: LT

## 2022-09-22 NOTE — REASON FOR VISIT
[Follow-Up Visit] : a follow-up visit for [Other: ____] : [unfilled] [FreeTextEntry2] : Left knee Synvisc inj#2 Lot# [id number] Expires on 2023/04. S/P Left DAA THR, DOS: 3/29/19. \par  \par

## 2022-09-22 NOTE — HISTORY OF PRESENT ILLNESS
[de-identified] : Patient is a 66 year old female with history of left knee osteoarthritis. She woke up yesterday with increased swelling of the knee. No fall, no trauma, no increase in activity. She has been icing and elevating as well as taking Meloxicam. The knee appears to have an effusion. \par \par The patient is here today for a Synvisc injection for the left knee. The patient is having osteoarthritic symptoms. \par Allergies: The patient denies allergies to medications and has no allergies to chicken,eggs, or feathers.\par Procedure: The patient has been identified by name and date of birth. Patient confirms that we are treating the left knee today.\par The left knee was aspirated using a 20 gauge needle from a superolateral approach and using sterile technique. The left knee was sterilely prepped with chlorhexidine. Ethyl chloride spray was used to anesthetize the skin and subQ tissue. Aspiration yielded 25 CC's of clear yellow synovial fluid. The patient was then injected with the Supartz into the left knee in the superolateral position. The patient tolerated the procedure well. The medication was delivered aseptically and atraumatically.\par Diagnosis: Osteoarthritis of the left knee\par Treatment: The patient was advised on the activities for today. I gave the patient instructions on postinjection ice and analgesia.\par Follow up recommended in one week.

## 2022-09-29 ENCOUNTER — APPOINTMENT (OUTPATIENT)
Dept: ORTHOPEDIC SURGERY | Facility: CLINIC | Age: 67
End: 2022-09-29

## 2022-09-29 VITALS — HEIGHT: 65 IN | WEIGHT: 140 LBS | BODY MASS INDEX: 23.32 KG/M2

## 2022-09-29 PROCEDURE — 99214 OFFICE O/P EST MOD 30 MIN: CPT | Mod: 25

## 2022-09-29 PROCEDURE — 20610 DRAIN/INJ JOINT/BURSA W/O US: CPT | Mod: 50

## 2022-10-06 NOTE — DISCUSSION/SUMMARY
[de-identified] : ROB LINDSEY is a 66 year female who presents with bone on bone right knee patellofemoral compartment arthritis. While the patient may eventually benefit from right total knee replacement, she wishes to remain nonoperative at this time. As such, the patient received an intraarticular cortisone injection in the right knee in the office today. The patient will follow up 6 weeks post injection. \par \par The patient also received her third Synvisc injection in her left knee in office today.

## 2022-10-06 NOTE — HISTORY OF PRESENT ILLNESS
[de-identified] : Patient is a 66 year old female with history of left knee osteoarthritis who presents for 3rd Synvisc injection of left knee.  Patient states when walking yesterday up the stairs when commuting to work, she felt right knee pain increase yesterday.  patient has known OA of the right knee, had cortisone injection in June followed by HA injections in July.  patient used salon-pas overnight with some relief.  patient not using any oral medications.  patient using a cane for ambulation \par

## 2022-10-06 NOTE — PROCEDURE
[de-identified] : Allergies: The patient denies allergies to medications and has no allergies to chicken,eggs, or feathers.\par Procedure: The patient has been identified by name and date of birth. Patient confirms that we are treating the left knee today.\par The left knee was aspirated using a 20 gauge needle from a anterior lateral approach and using sterile technique. The left knee was sterilely prepped with chlorhexidine. Ethyl chloride spray was used to anesthetize the skin and subQ tissue.  The patient was then injected with the Synviscz into the left knee in the superolateral position. The patient tolerated the procedure well. The medication was delivered aseptically and atraumatically.\par Diagnosis: Osteoarthritis of the left knee\par Treatment: The patient was advised on the activities for today. I gave the patient instructions on postinjection ice and analgesia.\par \par Using sterile technique, 2cc of depomedrol 40mg/ml, 4cc of 1% plain lidocaine, and 2 cc 0.25% marcaine was drawn up into a sterile syringe. The right knee was then sterilely prepped with chlorhexidine. Ethyl chloride spray was used to anesthetize the skin and subQ tissue.  The depomedrol/lidocaine/marcaine mixture was then injected into the knee joint in the anterolateral position. The patient tolerated the procedure well without difficulty. The patient was given instructions on the use of ice and anti-inflammatories post injection site soreness.		\par 	 \par

## 2022-10-06 NOTE — PHYSICAL EXAM
[de-identified] : The patient appears well nourished  and in no apparent distress.  The patient is alert and oriented to person, place, and time.   Affect and mood appear normal. The head is normocephalic and atraumatic.  The eyes reveal normal sclera and extra ocular muscles are intact. The tongue is midline with no apparent lesions.  Skin shows normal turgor with no evidence of eczema or psoriasis.  No respiratory distress noted.  Sensation grossly intact.		  [de-identified] : Exam of the right knee shows -10 to 114 degrees of flexion measured with a goniometer. There is an effusion. \par 5/5 motor strength bilaterally distally. Sensation intact distally.		  [de-identified] : X-ray: 4 views of the right knee demonstrate bone on bone patellofemoral compartment arthritis.

## 2022-10-06 NOTE — ADDENDUM
[FreeTextEntry1] : This note was authored by Tej Santos working as a medical scribe for Dr. Endy Young. The note was reviewed, edited, and revised by Dr. Endy Young whom is in agreement with the exam findings, imaging findings, and treatment plan. 09/29/2022

## 2022-10-06 NOTE — REASON FOR VISIT
[Follow-Up Visit] : a follow-up visit for [Other: ____] : [unfilled] [FreeTextEntry2] : Left knee Synvisc inj#3 Lot# [id number] Expires on 2023/04. S/P Left DAA THR, DOS: 3/29/19. \par

## 2022-11-10 ENCOUNTER — APPOINTMENT (OUTPATIENT)
Dept: ORTHOPEDIC SURGERY | Facility: CLINIC | Age: 67
End: 2022-11-10

## 2022-11-10 VITALS — WEIGHT: 140 LBS | BODY MASS INDEX: 23.32 KG/M2 | HEIGHT: 65 IN

## 2022-11-10 DIAGNOSIS — M17.11 UNILATERAL PRIMARY OSTEOARTHRITIS, RIGHT KNEE: ICD-10-CM

## 2022-11-10 DIAGNOSIS — M17.12 UNILATERAL PRIMARY OSTEOARTHRITIS, LEFT KNEE: ICD-10-CM

## 2022-11-10 PROCEDURE — 99213 OFFICE O/P EST LOW 20 MIN: CPT

## 2022-11-10 RX ORDER — MELOXICAM 7.5 MG/1
7.5 TABLET ORAL
Qty: 60 | Refills: 3 | Status: ACTIVE | COMMUNITY
Start: 2022-11-10 | End: 1900-01-01

## 2022-11-10 NOTE — DISCUSSION/SUMMARY
[de-identified] : ROB LINDSEY is a 66 year female who presents with right knee bone on bone patellofemoral compartment arthritis and left knee moderate patellofemoral compartment osteoarthritis.  Conservative options were discussed. She is aware she will eventually need TKR. Meloxicam refill was sent to the pharmacy.  Patient will follow up as needed.

## 2022-11-10 NOTE — HISTORY OF PRESENT ILLNESS
[de-identified] : Patient is a 66-year-old female with osteoarthritis of the bilateral knees presenting for follow up. She recently completed gel injections to the right knee on 7/28/22 and the left knee on 9/29/22.  She reports significant improvement following these injections.  She continues on exercise and continues to wear compression sleeves on both knees.  Patient takes meloxicam as needed.  Overall she is doing better.

## 2022-11-10 NOTE — PHYSICAL EXAM
[de-identified] : The patient appears well nourished  and in no apparent distress.  The patient is alert and oriented to person, place, and time.   Affect and mood appear normal. The head is normocephalic and atraumatic.  The eyes reveal normal sclera and extra ocular muscles are intact. The tongue is midline with no apparent lesions.  Skin shows normal turgor with no evidence of eczema or psoriasis.  No respiratory distress noted.  Sensation grossly intact.		  [de-identified] : Exam of the right knee shows  -2 to 125 degrees of flexion measured with a goniometer. There is patellofemoral crepitance. There is an effusion. \par Exam left knee: Skin is within normal limits there is no effusion.  Range of motion 0 to 120 degrees of flexion with pain at deep flexion.  No medial or lateral joint line tenderness.\par   5/5 motor strength bilaterally distally. Sensation intact distally.		  [de-identified] : Prior X-ray 4 view left knee demonstrate moderate patellofemoral compartment osteoarthritis with osteophyte formation.\par \par Prior x-ray: 4 views of the right knee demonstrate bone on bone patellofemoral compartment arthritis with osteophytes.

## 2023-03-11 ENCOUNTER — OFFICE (OUTPATIENT)
Dept: URBAN - METROPOLITAN AREA CLINIC 115 | Facility: CLINIC | Age: 68
Setting detail: OPHTHALMOLOGY
End: 2023-03-11
Payer: MEDICARE

## 2023-03-11 DIAGNOSIS — H35.033: ICD-10-CM

## 2023-03-11 DIAGNOSIS — H10.45: ICD-10-CM

## 2023-03-11 DIAGNOSIS — H40.033: ICD-10-CM

## 2023-03-11 DIAGNOSIS — H30.143: ICD-10-CM

## 2023-03-11 PROCEDURE — 92014 COMPRE OPH EXAM EST PT 1/>: CPT | Performed by: OPHTHALMOLOGY

## 2023-03-11 PROCEDURE — 92250 FUNDUS PHOTOGRAPHY W/I&R: CPT | Performed by: OPHTHALMOLOGY

## 2023-03-11 ASSESSMENT — REFRACTION_MANIFEST
OD_CYLINDER: -0.50
OD_VA1: 20/20
OU_VA: 20/20
OD_SPHERE: +0.25
OS_VA1: 20/20
OD_VA1: 20/20
OS_ADD: +2.50
OD_ADD: +2.50
OU_VA: 20/20
OS_SPHERE: +0.50
OS_CYLINDER: -1.25
OD_SPHERE: +0.25
OS_CYLINDER: -1.00
OD_CYLINDER: -0.50
OD_AXIS: 085
OD_AXIS: 090
OS_VA1: 20/20
OD_ADD: +2.50
OS_AXIS: 110
OS_ADD: +2.50
OS_SPHERE: +0.50
OS_AXIS: 105

## 2023-03-11 ASSESSMENT — REFRACTION_CURRENTRX
OD_ADD: +2.50
OS_ADD: +2.50
OS_ADD: +2.50
OS_VPRISM_DIRECTION: PROGS
OD_VPRISM_DIRECTION: PROGS
OS_OVR_VA: 20/
OD_CYLINDER: -0.50
OS_AXIS: 102
OD_OVR_VA: 20/
OS_OVR_VA: 20/
OS_SPHERE: +0.50
OD_AXIS: 090
OS_VPRISM_DIRECTION: PROGS
OS_SPHERE: +0.50
OS_CYLINDER: -1.25
OD_OVR_VA: 20/
OD_VPRISM_DIRECTION: PROGS
OD_SPHERE: +0.25
OD_AXIS: 085
OS_CYLINDER: -1.25
OD_CYLINDER: -0.50
OD_SPHERE: +0.25
OS_AXIS: 105
OD_ADD: +2.50

## 2023-03-11 ASSESSMENT — REFRACTION_AUTOREFRACTION
OD_AXIS: 055
OD_CYLINDER: -0.75
OS_SPHERE: +1.25
OS_AXIS: 113
OS_CYLINDER: -1.75
OD_SPHERE: +0.75

## 2023-03-11 ASSESSMENT — SPHEQUIV_DERIVED
OD_SPHEQUIV: 0
OD_SPHEQUIV: 0.375
OS_SPHEQUIV: 0
OS_SPHEQUIV: -0.125
OD_SPHEQUIV: 0
OS_SPHEQUIV: 0.375

## 2023-03-11 ASSESSMENT — VISUAL ACUITY
OD_BCVA: 20/20
OS_BCVA: 20/30-1

## 2023-03-11 ASSESSMENT — TONOMETRY
OS_IOP_MMHG: 10
OD_IOP_MMHG: 12

## 2023-03-11 ASSESSMENT — CONFRONTATIONAL VISUAL FIELD TEST (CVF)
OD_FINDINGS: FULL
OS_FINDINGS: FULL

## 2023-03-27 ENCOUNTER — APPOINTMENT (OUTPATIENT)
Dept: GASTROENTEROLOGY | Facility: CLINIC | Age: 68
End: 2023-03-27

## 2023-04-27 ENCOUNTER — APPOINTMENT (OUTPATIENT)
Dept: ORTHOPEDIC SURGERY | Facility: CLINIC | Age: 68
End: 2023-04-27

## 2023-06-14 NOTE — ED PROVIDER NOTE - NS ED ATTENDING STATEMENT MOD
Attending Only R3 Triage Note    Pt is a 35 y/o  at 19w3d with mary TIUP  presenting with intermittent leakage of fluid s/p intercourse. Patient states that she noted a small amount of leakage on  after intercourse. She wasn't concerned initially but then noted intermittent leakage of fluid throughout the past few days, denies a big gush. She denies ctx at this time but has had contrations 1 week ago.  Pt denies ctx, VB,  FM felt.   Prenatal course uncomplicated      OBHx:  x3 (, , , ), pelvis proven to 9#  pCS (2017): 7# for arrest  SABx1  GynHx: denies  PMHx: denies  PSHx: CSx1  Med: denies  All: NKDA  SH: denies t/a/d  Psych: +anxiety (not in therapy) This was a shared visit with the RM. I reviewed and verified the documentation and independently performed the documented:

## 2023-07-01 ENCOUNTER — NON-APPOINTMENT (OUTPATIENT)
Age: 68
End: 2023-07-01

## 2023-08-08 ENCOUNTER — TRANSCRIPTION ENCOUNTER (OUTPATIENT)
Age: 68
End: 2023-08-08

## 2023-08-10 ENCOUNTER — APPOINTMENT (OUTPATIENT)
Dept: ORTHOPEDIC SURGERY | Facility: CLINIC | Age: 68
End: 2023-08-10
Payer: MEDICARE

## 2023-08-10 VITALS
HEIGHT: 65 IN | WEIGHT: 140 LBS | SYSTOLIC BLOOD PRESSURE: 116 MMHG | BODY MASS INDEX: 23.32 KG/M2 | HEART RATE: 66 BPM | DIASTOLIC BLOOD PRESSURE: 71 MMHG

## 2023-08-10 DIAGNOSIS — M79.644 PAIN IN RIGHT FINGER(S): ICD-10-CM

## 2023-08-10 PROCEDURE — 73130 X-RAY EXAM OF HAND: CPT | Mod: 50

## 2023-08-10 PROCEDURE — 99214 OFFICE O/P EST MOD 30 MIN: CPT | Mod: 25

## 2023-08-10 PROCEDURE — 20550 NJX 1 TENDON SHEATH/LIGAMENT: CPT | Mod: LT

## 2023-08-10 RX ORDER — MELOXICAM 15 MG/1
15 TABLET ORAL DAILY
Qty: 15 | Refills: 1 | Status: ACTIVE | COMMUNITY
Start: 2023-08-10 | End: 1900-01-01

## 2023-08-10 NOTE — HISTORY OF PRESENT ILLNESS
[FreeTextEntry1] : Ziyad is a very pleasant 67-year-old female who presents today with a chronic greater than 1 year history of worsening left middle finger pain swelling and stiffness.  Recently it has progressed tremendously and has not improved with oral medications including meloxicam.  She has tried bracing as well.

## 2023-08-10 NOTE — ASSESSMENT
[FreeTextEntry1] : ASSESSMENT: The patient comes in today with chronic exacerbated symptoms of tendinopathy of the left middle finger.  We have discussed an injection today and she wants meloxicam.  The patient was adequately and thoroughly informed of my assessment of their current condition(s).  - This may diminish bodily function for the extremity. We discussed prognosis, treatment modalities including operative and nonoperative options for the above diagnostic assessment. [For this I was able to review other physicians note(s) including reviewing other tests, imaging results as well as personally view these results for my own interpretation.]  Injection:  The risks and benefits of a steroid injection were discussed in detail. The risks include but are not limited to: pain, infection, swelling, flare response, bleeding, subcutaneous fat atrophy, skin depigmentation and/or elevation of blood sugar. The risk of incomplete resolution of symptoms, recurrence and additional intervention was reviewed and considered by the patient.  The patient agreed to proceed and under a sterile prep, I injected 1 unit into 1 cc of a combination of Celestone and Lidocaine into the left middle A1. The patient tolerated the injection well.  The patient was adequately and thoroughly informed of my assessment of their current condition(s).  A prescription for meloxicam was given today. The patient was instructed to take this with food and discontinue other NSAIDs while taking this. The risks, benefits and black box warnings were discussed. The patient was instructed to discontinue the medication if it began hurting her stomach.  DISCUSSION: 1.  Meloxicam as above 2.  I am hopeful that the injection for the left middle helps relieve her tendinopathy.  Follow-up in 8 weeks

## 2023-08-10 NOTE — PHYSICAL EXAM
[de-identified] : Examination of the [left] hand particularly at the A1 of the middle reveals tenderness with a palpable click.    [de-identified] : [4] views of [bilateral hands and wrists] were obtained today in my office and were seen by me and discussed with the patient.  These [show findings consistent with bilateral basal joint OA and findings of IP joint OA]

## 2023-09-09 ENCOUNTER — OFFICE (OUTPATIENT)
Dept: URBAN - METROPOLITAN AREA CLINIC 115 | Facility: CLINIC | Age: 68
Setting detail: OPHTHALMOLOGY
End: 2023-09-09
Payer: MEDICARE

## 2023-09-09 DIAGNOSIS — Z96.1: ICD-10-CM

## 2023-09-09 DIAGNOSIS — H35.033: ICD-10-CM

## 2023-09-09 DIAGNOSIS — H40.033: ICD-10-CM

## 2023-09-09 DIAGNOSIS — H30.143: ICD-10-CM

## 2023-09-09 DIAGNOSIS — H10.45: ICD-10-CM

## 2023-09-09 PROCEDURE — 92133 CPTRZD OPH DX IMG PST SGM ON: CPT | Performed by: OPHTHALMOLOGY

## 2023-09-09 PROCEDURE — 99213 OFFICE O/P EST LOW 20 MIN: CPT | Performed by: OPHTHALMOLOGY

## 2023-09-09 ASSESSMENT — REFRACTION_MANIFEST
OD_CYLINDER: -0.50
OD_ADD: +2.50
OD_VA1: 20/20
OS_VA1: 20/20
OS_ADD: +2.50
OD_SPHERE: +0.50
OS_CYLINDER: -1.00
OD_VA1: 20/20
OS_CYLINDER: -1.50
OD_SPHERE: +0.25
OU_VA: 20/20
OS_ADD: +2.50
OD_AXIS: 085
OS_VA1: 20/20
OU_VA: 20/20
OD_AXIS: 070
OS_AXIS: 120
OD_ADD: +2.50
OS_SPHERE: +0.50
OS_SPHERE: +0.50
OD_CYLINDER: -0.50
OS_AXIS: 110

## 2023-09-09 ASSESSMENT — REFRACTION_CURRENTRX
OS_CYLINDER: -0.75
OS_VPRISM_DIRECTION: PROGS
OS_ADD: +2.00
OS_ADD: +2.50
OD_CYLINDER: -0.50
OD_SPHERE: +0.25
OS_SPHERE: +0.50
OS_CYLINDER: -1.25
OS_OVR_VA: 20/
OD_OVR_VA: 20/
OD_OVR_VA: 20/
OD_ADD: +2.50
OS_AXIS: 102
OD_AXIS: 085
OD_CYLINDER: -0.50
OS_AXIS: 095
OD_SPHERE: +0.25
OS_OVR_VA: 20/
OD_ADD: +2.50
OS_AXIS: 105
OD_VPRISM_DIRECTION: PROGS
OD_SPHERE: +0.25
OD_ADD: +2.25
OD_VPRISM_DIRECTION: PROGS
OD_VPRISM_DIRECTION: PROGS
OS_SPHERE: +0.50
OS_OVR_VA: 20/
OD_CYLINDER: -0.50
OD_AXIS: 085
OD_OVR_VA: 20/
OS_VPRISM_DIRECTION: PROGS
OS_ADD: +2.50
OS_CYLINDER: -1.25
OS_SPHERE: +0.50
OD_AXIS: 090
OS_VPRISM_DIRECTION: PROGS

## 2023-09-09 ASSESSMENT — REFRACTION_AUTOREFRACTION
OD_AXIS: 051
OD_CYLINDER: -1.25
OD_SPHERE: +0.75
OS_CYLINDER: -1.75
OS_AXIS: 111
OS_SPHERE: +0.75

## 2023-09-09 ASSESSMENT — VISUAL ACUITY
OS_BCVA: 20/25+1
OD_BCVA: 20/25-1

## 2023-09-09 ASSESSMENT — SPHEQUIV_DERIVED
OD_SPHEQUIV: 0.25
OD_SPHEQUIV: 0.125
OS_SPHEQUIV: -0.125
OS_SPHEQUIV: 0
OS_SPHEQUIV: -0.25
OD_SPHEQUIV: 0

## 2023-09-09 ASSESSMENT — TONOMETRY
OD_IOP_MMHG: 11
OS_IOP_MMHG: 11

## 2023-09-09 ASSESSMENT — CONFRONTATIONAL VISUAL FIELD TEST (CVF)
OD_FINDINGS: FULL
OS_FINDINGS: FULL

## 2023-10-12 ENCOUNTER — APPOINTMENT (OUTPATIENT)
Dept: ORTHOPEDIC SURGERY | Facility: CLINIC | Age: 68
End: 2023-10-12
Payer: MEDICARE

## 2023-10-12 VITALS
HEIGHT: 65 IN | WEIGHT: 140 LBS | HEART RATE: 64 BPM | SYSTOLIC BLOOD PRESSURE: 107 MMHG | DIASTOLIC BLOOD PRESSURE: 68 MMHG | BODY MASS INDEX: 23.32 KG/M2

## 2023-10-12 PROCEDURE — 99214 OFFICE O/P EST MOD 30 MIN: CPT | Mod: 25

## 2023-10-12 PROCEDURE — 20550 NJX 1 TENDON SHEATH/LIGAMENT: CPT | Mod: LT

## 2023-11-30 NOTE — BRIEF OPERATIVE NOTE - NSICDXBRIEFPOSTOP_GEN_ALL_CORE_FT
POST-OP DIAGNOSIS:  Primary osteoarthritis of left hip 29-Mar-2019 12:16:52  Endy Young 4 = No assist / stand by assistance

## 2023-12-12 ENCOUNTER — APPOINTMENT (OUTPATIENT)
Dept: OTOLARYNGOLOGY | Facility: CLINIC | Age: 68
End: 2023-12-12
Payer: MEDICARE

## 2023-12-12 VITALS — HEIGHT: 65 IN | WEIGHT: 140 LBS | BODY MASS INDEX: 23.32 KG/M2

## 2023-12-12 DIAGNOSIS — H61.21 IMPACTED CERUMEN, RIGHT EAR: ICD-10-CM

## 2023-12-12 DIAGNOSIS — H90.3 SENSORINEURAL HEARING LOSS, BILATERAL: ICD-10-CM

## 2023-12-12 DIAGNOSIS — H93.8X2 OTHER SPECIFIED DISORDERS OF LEFT EAR: ICD-10-CM

## 2023-12-12 DIAGNOSIS — H61.22 IMPACTED CERUMEN, LEFT EAR: ICD-10-CM

## 2023-12-12 PROCEDURE — 99204 OFFICE O/P NEW MOD 45 MIN: CPT | Mod: 25

## 2023-12-12 PROCEDURE — 92567 TYMPANOMETRY: CPT

## 2023-12-12 PROCEDURE — G0268 REMOVAL OF IMPACTED WAX MD: CPT

## 2023-12-12 PROCEDURE — 92557 COMPREHENSIVE HEARING TEST: CPT

## 2023-12-13 NOTE — PHYSICAL THERAPY INITIAL EVALUATION ADULT - DIAGNOSIS, PT EVAL
Detail Level: Zone
Patient Specific Counseling (Will Not Stick From Patient To Patient): - Apply Efudex 5% cream to affected areas of the face QHS for 2 weeks as tolerated \\n- Patient will call our office if he needs a refill
Impaired functional mobility due to decreased strength and endurance

## 2023-12-14 ENCOUNTER — APPOINTMENT (OUTPATIENT)
Dept: ORTHOPEDIC SURGERY | Facility: CLINIC | Age: 68
End: 2023-12-14
Payer: MEDICARE

## 2023-12-14 VITALS
BODY MASS INDEX: 23.32 KG/M2 | HEART RATE: 63 BPM | SYSTOLIC BLOOD PRESSURE: 146 MMHG | HEIGHT: 65 IN | WEIGHT: 140 LBS | DIASTOLIC BLOOD PRESSURE: 83 MMHG

## 2023-12-14 PROCEDURE — 20550 NJX 1 TENDON SHEATH/LIGAMENT: CPT | Mod: LT

## 2023-12-14 PROCEDURE — 99214 OFFICE O/P EST MOD 30 MIN: CPT | Mod: 25

## 2023-12-14 RX ORDER — MELOXICAM 15 MG/1
15 TABLET ORAL DAILY
Qty: 15 | Refills: 1 | Status: ACTIVE | COMMUNITY
Start: 2023-12-14 | End: 1900-01-01

## 2023-12-14 NOTE — ASSESSMENT
[FreeTextEntry1] : ASSESSMENT: The patient comes in today with chronic exacerbated symptoms of tendinopathy of the left middle finger. We have discussed an injection today once again.  She would like to repeat this  The patient was adequately and thoroughly informed of my assessment of their current condition(s). - This may diminish bodily function for the extremity. We discussed prognosis, treatment modalities including operative and nonoperative options for the above diagnostic assessment. [For this I was able to review other physicians note(s) including reviewing other tests, imaging results as well as personally view these results for my own interpretation.]  Injection:  The risks and benefits of a steroid injection were discussed in detail. The risks include but are not limited to: pain, infection, swelling, flare response, bleeding, subcutaneous fat atrophy, skin depigmentation and/or elevation of blood sugar. The risk of incomplete resolution of symptoms, recurrence and additional intervention was reviewed and considered by the patient. The patient agreed to proceed and under a sterile prep, I injected 1 unit into 1 cc of a combination of Celestone and Lidocaine into the left middle A1. The patient tolerated the injection well.  The patient was adequately and thoroughly informed of my assessment of their current condition(s).  A prescription for meloxicam was given today. The patient was instructed to take this with food and discontinue other NSAIDs while taking this. The risks, benefits and black box warnings were discussed. The patient was instructed to discontinue the medication if it began hurting her stomach.  DISCUSSION: 1.  Meloxicam as requested.  Left middle trigger injection. prn

## 2023-12-14 NOTE — HISTORY OF PRESENT ILLNESS
[FreeTextEntry1] : Ziyad returns for follow-up with a chronic greater than 1 year history of worsening left middle finger pain swelling and stiffness.  Recently it has progressed tremendously and has not improved with oral medications including meloxicam.  Prior injection was beneficial.  She would like to repeat this

## 2023-12-14 NOTE — PHYSICAL EXAM
[de-identified] : Examination of the [left] hand particularly at the A1 of the middle reveals tenderness with a palpable click.    [de-identified] : [4] views of [bilateral hands and wrists] were reviewed today in my office and were seen by me and discussed with the patient.  These [show findings consistent with bilateral basal joint OA and findings of IP joint OA]

## 2023-12-26 ENCOUNTER — OUTPATIENT (OUTPATIENT)
Dept: OUTPATIENT SERVICES | Facility: HOSPITAL | Age: 68
LOS: 1 days | End: 2023-12-26

## 2023-12-26 ENCOUNTER — APPOINTMENT (OUTPATIENT)
Dept: MRI IMAGING | Facility: CLINIC | Age: 68
End: 2023-12-26
Payer: MEDICARE

## 2023-12-26 ENCOUNTER — NON-APPOINTMENT (OUTPATIENT)
Age: 68
End: 2023-12-26

## 2023-12-26 DIAGNOSIS — Z98.890 OTHER SPECIFIED POSTPROCEDURAL STATES: Chronic | ICD-10-CM

## 2023-12-26 DIAGNOSIS — H90.3 SENSORINEURAL HEARING LOSS, BILATERAL: ICD-10-CM

## 2023-12-26 PROCEDURE — 70553 MRI BRAIN STEM W/O & W/DYE: CPT | Mod: 26

## 2023-12-27 ENCOUNTER — NON-APPOINTMENT (OUTPATIENT)
Age: 68
End: 2023-12-27

## 2023-12-28 ENCOUNTER — NON-APPOINTMENT (OUTPATIENT)
Age: 68
End: 2023-12-28

## 2024-03-14 ENCOUNTER — APPOINTMENT (OUTPATIENT)
Dept: ORTHOPEDIC SURGERY | Facility: CLINIC | Age: 69
End: 2024-03-14
Payer: MEDICARE

## 2024-03-14 PROCEDURE — 20550 NJX 1 TENDON SHEATH/LIGAMENT: CPT | Mod: LT

## 2024-03-14 PROCEDURE — 99214 OFFICE O/P EST MOD 30 MIN: CPT | Mod: 25

## 2024-03-14 RX ORDER — MELOXICAM 15 MG/1
15 TABLET ORAL DAILY
Qty: 30 | Refills: 0 | Status: ACTIVE | COMMUNITY
Start: 2024-03-14 | End: 1900-01-01

## 2024-03-14 NOTE — ASSESSMENT
[FreeTextEntry1] : ASSESSMENT: The patient comes in today with chronic exacerbated symptoms of tendinopathy of the left middle finger. We have discussed an injection today once again.  She would like to repeat this.  She would like a new prescription of meloxicam as well.  The patient was adequately and thoroughly informed of my assessment of their current condition(s). - This may diminish bodily function for the extremity. We discussed prognosis, treatment modalities including operative and nonoperative options for the above diagnostic assessment. [For this I was able to review other physicians note(s) including reviewing other tests, imaging results as well as personally view these results for my own interpretation.]  Injection:  The risks and benefits of a steroid injection were discussed in detail. The risks include but are not limited to: pain, infection, swelling, flare response, bleeding, subcutaneous fat atrophy, skin depigmentation and/or elevation of blood sugar. The risk of incomplete resolution of symptoms, recurrence and additional intervention was reviewed and considered by the patient. The patient agreed to proceed and under a sterile prep, I injected 1 unit into 1 cc of a combination of Celestone and Lidocaine into the left middle A1. The patient tolerated the injection well.  The patient was adequately and thoroughly informed of my assessment of their current condition(s).  A prescription for meloxicam was given today. The patient was instructed to take this with food and discontinue other NSAIDs while taking this. The risks, benefits and black box warnings were discussed. The patient was instructed to discontinue the medication if it began hurting her stomach.  DISCUSSION: 1.  Meloxicam as requested.  Left middle trigger injection. prn

## 2024-03-14 NOTE — PHYSICAL EXAM
[de-identified] : Examination of the [left] hand particularly at the A1 of the middle reveals tenderness with a palpable click.    [de-identified] : [4] views of [bilateral hands and wrists] were reviewed today in my office and were seen by me and discussed with the patient.  These [show findings consistent with bilateral basal joint OA and findings of IP joint OA]

## 2024-03-16 ENCOUNTER — OFFICE (OUTPATIENT)
Dept: URBAN - METROPOLITAN AREA CLINIC 115 | Facility: CLINIC | Age: 69
Setting detail: OPHTHALMOLOGY
End: 2024-03-16
Payer: MEDICARE

## 2024-03-16 DIAGNOSIS — H30.143: ICD-10-CM

## 2024-03-16 DIAGNOSIS — H52.03: ICD-10-CM

## 2024-03-16 DIAGNOSIS — H40.033: ICD-10-CM

## 2024-03-16 DIAGNOSIS — Z96.1: ICD-10-CM

## 2024-03-16 DIAGNOSIS — H35.033: ICD-10-CM

## 2024-03-16 DIAGNOSIS — H10.45: ICD-10-CM

## 2024-03-16 PROCEDURE — 92014 COMPRE OPH EXAM EST PT 1/>: CPT | Performed by: OPHTHALMOLOGY

## 2024-03-16 PROCEDURE — 92083 EXTENDED VISUAL FIELD XM: CPT | Performed by: OPHTHALMOLOGY

## 2024-03-16 ASSESSMENT — REFRACTION_CURRENTRX
OD_CYLINDER: -0.50
OD_OVR_VA: 20/
OD_AXIS: 090
OD_OVR_VA: 20/
OD_VPRISM_DIRECTION: PROGS
OD_ADD: +2.50
OD_SPHERE: +0.25
OD_SPHERE: +0.25
OS_ADD: +2.50
OD_VPRISM_DIRECTION: PROGS
OD_AXIS: 085
OD_ADD: +2.50
OS_ADD: +2.00
OS_SPHERE: +0.50
OS_VPRISM_DIRECTION: PROGS
OD_SPHERE: +0.25
OD_OVR_VA: 20/
OS_OVR_VA: 20/
OD_AXIS: 085
OS_CYLINDER: -1.25
OS_ADD: +2.50
OS_CYLINDER: -0.75
OS_AXIS: 095
OD_VPRISM_DIRECTION: PROGS
OD_CYLINDER: -0.50
OS_AXIS: 102
OS_OVR_VA: 20/
OS_AXIS: 105
OS_VPRISM_DIRECTION: PROGS
OS_SPHERE: +0.50
OS_SPHERE: +0.50
OD_ADD: +2.25
OS_VPRISM_DIRECTION: PROGS
OS_OVR_VA: 20/
OS_CYLINDER: -1.25
OD_CYLINDER: -0.50

## 2024-03-16 ASSESSMENT — REFRACTION_MANIFEST
OS_CYLINDER: -1.50
OD_SPHERE: +0.50
OD_SPHERE: +0.25
OS_AXIS: 110
OU_VA: 20/20
OU_VA: 20/20
OS_SPHERE: +0.50
OS_AXIS: 120
OD_CYLINDER: -0.50
OD_ADD: +2.50
OD_VA1: 20/20
OS_CYLINDER: -1.00
OD_AXIS: 085
OD_CYLINDER: -0.50
OS_VA1: 20/20
OS_ADD: +2.50
OD_VA1: 20/20
OS_SPHERE: +0.50
OD_ADD: +2.50
OD_AXIS: 070
OS_ADD: +2.50
OS_VA1: 20/20

## 2024-03-16 ASSESSMENT — SPHEQUIV_DERIVED
OD_SPHEQUIV: 0
OS_SPHEQUIV: 0
OS_SPHEQUIV: -0.25
OD_SPHEQUIV: 0.25

## 2024-05-13 NOTE — H&P PST ADULT - BREASTS COMMENTS
May 13, 2024        Reginald Blanc  [address]  [facility] [id number]    Brittney Montelongo,      Your health and wellness have never been more important. We have been trying to reach you to talk to you about scheduling a health and wellness visit to help you better manage your health and stay safe during these uncertain times.  This visit can be done in the comfort of your home with a Nurse Practitioner from [facility].      Key benefits for YOU?   Convenience: Our Nurse Practitioner can do screenings in your home that you would have to go out to the clinic for. They will talk about important care for you and help you set health goals for the new year.    Health exam:  The Nurse Practitioner will check your blood pressure, screen for diabetes, and check your lungs by doing a quick breathing test.   Better manage your health: The friendly, quality provider will review your medications, medical history, and talk about your health concerns and questions.  Take your time: The appointment is 45 minutes, allowing plenty of time to get to know your provider, review your health information, and talk about your health goals.  Get the care you need: The provider will share your health information, concerns, and priorities with your doctor, so that your doctor and care team can help you get the care you need, when you need it.    Next steps  To take advantage of this new program, please call us back to schedule an appointment at [phone], Monday - Friday, 8 a.m. ? 4 p.m. CST.  We can also answer any questions you may have about this appointment.      Be well!  Your health care partners at [facility]       
Pt refused exam

## 2024-06-10 ENCOUNTER — NON-APPOINTMENT (OUTPATIENT)
Age: 69
End: 2024-06-10

## 2024-06-11 ENCOUNTER — APPOINTMENT (OUTPATIENT)
Dept: OTOLARYNGOLOGY | Facility: CLINIC | Age: 69
End: 2024-06-11
Payer: MEDICARE

## 2024-06-11 VITALS — WEIGHT: 140 LBS | BODY MASS INDEX: 23.32 KG/M2 | HEIGHT: 65 IN

## 2024-06-11 DIAGNOSIS — H61.23 IMPACTED CERUMEN, BILATERAL: ICD-10-CM

## 2024-06-11 DIAGNOSIS — H90.3 SENSORINEURAL HEARING LOSS, BILATERAL: ICD-10-CM

## 2024-06-11 PROCEDURE — 99214 OFFICE O/P EST MOD 30 MIN: CPT | Mod: 25

## 2024-06-11 PROCEDURE — G0268 REMOVAL OF IMPACTED WAX MD: CPT

## 2024-06-11 PROCEDURE — 92567 TYMPANOMETRY: CPT

## 2024-06-11 PROCEDURE — 92557 COMPREHENSIVE HEARING TEST: CPT

## 2024-06-11 RX ORDER — ROSUVASTATIN CALCIUM 5 MG/1
TABLET, FILM COATED ORAL
Refills: 0 | Status: ACTIVE | COMMUNITY

## 2024-06-11 NOTE — PHYSICAL EXAM
[Midline] : trachea located in midline position [Normal] : no rashes [de-identified] : xs cerumen right ear ; left cerumen impaction [de-identified] : after removal

## 2024-06-11 NOTE — ASSESSMENT
[FreeTextEntry1] : Patient here for followup of cerumen and follow up of hearing.  Patient did have asymetric hearing loss prior and had neg MRI one year ago. Patient.wiht normal ear exam after cerumen removed and audio stable.  Recommended followup one year.

## 2024-06-11 NOTE — HISTORY OF PRESENT ILLNESS
[de-identified] : Here for follow of ekaterina -  Feels ears clogged.  Patient did have asymmetric snhl one year ago - had neg MRI after.

## 2024-06-20 ENCOUNTER — APPOINTMENT (OUTPATIENT)
Dept: ORTHOPEDIC SURGERY | Facility: CLINIC | Age: 69
End: 2024-06-20

## 2024-06-20 VITALS
WEIGHT: 140 LBS | DIASTOLIC BLOOD PRESSURE: 79 MMHG | HEART RATE: 61 BPM | BODY MASS INDEX: 23.32 KG/M2 | SYSTOLIC BLOOD PRESSURE: 125 MMHG | HEIGHT: 65 IN

## 2024-06-20 DIAGNOSIS — M65.30 TRIGGER FINGER, UNSPECIFIED FINGER: ICD-10-CM

## 2024-06-20 PROCEDURE — 20550 NJX 1 TENDON SHEATH/LIGAMENT: CPT | Mod: LT

## 2024-06-20 PROCEDURE — 99214 OFFICE O/P EST MOD 30 MIN: CPT | Mod: 25

## 2024-06-20 NOTE — PHYSICAL EXAM
[de-identified] : Examination of the [left] hand particularly at the A1 of the middle reveals tenderness with a palpable click.    [de-identified] : [4] views of [bilateral hands and wrists] were reviewed today in my office and were seen by me and discussed with the patient.  These [show findings consistent with bilateral basal joint OA and findings of IP joint OA]

## 2024-06-20 NOTE — ASSESSMENT
[FreeTextEntry1] : ASSESSMENT: The patient comes in today with chronic exacerbated symptoms of tendinopathy of the left middle finger. We have discussed an injection today once again.  She would like to repeat this.   The patient was adequately and thoroughly informed of my assessment of their current condition(s). - This may diminish bodily function for the extremity. We discussed prognosis, treatment modalities including operative and nonoperative options for the above diagnostic assessment. [For this I was able to review other physicians note(s) including reviewing other tests, imaging results as well as personally view these results for my own interpretation.]  Injection:  The risks and benefits of a steroid injection were discussed in detail. The risks include but are not limited to: pain, infection, swelling, flare response, bleeding, subcutaneous fat atrophy, skin depigmentation and/or elevation of blood sugar. The risk of incomplete resolution of symptoms, recurrence and additional intervention was reviewed and considered by the patient. The patient agreed to proceed and under a sterile prep, I injected 1 unit into 1 cc of a combination of Celestone and Lidocaine into the left middle A1. The patient tolerated the injection well.  The patient was adequately and thoroughly informed of my assessment of their current condition(s).   DISCUSSION: 1.  Meloxicam as needed.  Left middle trigger injection.  Follow-up 3 months as requested by patient

## 2024-09-04 NOTE — CHART NOTE - NSCHARTNOTESELECT_GEN_ALL_CORE
The pain you are experiencing has a large inflammatory component to it, so anti-inflammatories will be very helpful. Please take: 600 mg of ibuprofen AND 1000 mg of tylenol three times a day. It is ok to take these medications at the same time. After 2-3 days, please take the medications only as needed.     Please call the orthopedic clinic for follow up.   Event Note

## 2024-09-14 ENCOUNTER — OFFICE (OUTPATIENT)
Dept: URBAN - METROPOLITAN AREA CLINIC 115 | Facility: CLINIC | Age: 69
Setting detail: OPHTHALMOLOGY
End: 2024-09-14
Payer: MEDICARE

## 2024-09-14 DIAGNOSIS — H52.03: ICD-10-CM

## 2024-09-14 DIAGNOSIS — H40.033: ICD-10-CM

## 2024-09-14 DIAGNOSIS — Z96.1: ICD-10-CM

## 2024-09-14 DIAGNOSIS — H30.143: ICD-10-CM

## 2024-09-14 DIAGNOSIS — H35.033: ICD-10-CM

## 2024-09-14 DIAGNOSIS — H10.45: ICD-10-CM

## 2024-09-14 PROCEDURE — 92133 CPTRZD OPH DX IMG PST SGM ON: CPT | Performed by: OPHTHALMOLOGY

## 2024-09-14 PROCEDURE — 99213 OFFICE O/P EST LOW 20 MIN: CPT | Performed by: OPHTHALMOLOGY

## 2024-09-14 ASSESSMENT — CONFRONTATIONAL VISUAL FIELD TEST (CVF)
OD_FINDINGS: FULL
OS_FINDINGS: FULL

## 2024-09-19 ENCOUNTER — APPOINTMENT (OUTPATIENT)
Dept: ORTHOPEDIC SURGERY | Facility: CLINIC | Age: 69
End: 2024-09-19

## 2024-09-19 VITALS
SYSTOLIC BLOOD PRESSURE: 152 MMHG | BODY MASS INDEX: 23.32 KG/M2 | HEIGHT: 65 IN | WEIGHT: 140 LBS | DIASTOLIC BLOOD PRESSURE: 79 MMHG

## 2024-09-19 DIAGNOSIS — M65.30 TRIGGER FINGER, UNSPECIFIED FINGER: ICD-10-CM

## 2024-09-19 PROCEDURE — 99214 OFFICE O/P EST MOD 30 MIN: CPT | Mod: 25

## 2024-09-19 PROCEDURE — 20600 DRAIN/INJ JOINT/BURSA W/O US: CPT | Mod: F2

## 2024-09-19 RX ORDER — MELOXICAM 15 MG/1
15 TABLET ORAL DAILY
Qty: 30 | Refills: 0 | Status: ACTIVE | COMMUNITY
Start: 2024-09-19 | End: 1900-01-01

## 2024-09-19 NOTE — ASSESSMENT
[FreeTextEntry1] : ASSESSMENT: The patient comes in today with chronic exacerbated symptoms of tendinopathy of the left middle finger. We have discussed an injection today once again.  She would like to repeat this.  She would like oral anti-inflammatory medication as well.  We have discussed risks and benefits of all of the above.  She verbalized understanding  The patient was adequately and thoroughly informed of my assessment of their current condition(s). - This may diminish bodily function for the extremity. We discussed prognosis, treatment modalities including operative and nonoperative options for the above diagnostic assessment. [For this I was able to review other physicians note(s) including reviewing other tests, imaging results as well as personally view these results for my own interpretation.]  Injection:  The risks and benefits of a steroid injection were discussed in detail. The risks include but are not limited to: pain, infection, swelling, flare response, bleeding, subcutaneous fat atrophy, skin depigmentation and/or elevation of blood sugar. The risk of incomplete resolution of symptoms, recurrence and additional intervention was reviewed and considered by the patient. The patient agreed to proceed and under a sterile prep, I injected 1 unit into 1 cc of a combination of Celestone and Lidocaine into the left middle A1. The patient tolerated the injection well.  The patient was adequately and thoroughly informed of my assessment of their current condition(s).  A prescription for meloxicam was given today. The patient was instructed to take this with food and discontinue other NSAIDs while taking this. The risks, benefits and black box warnings were discussed. The patient was instructed to discontinue the medication if it began hurting her stomach.  DISCUSSION: 1.  Meloxicam as above.  Left middle trigger injection.  Follow-up 3 months as requested by patient

## 2024-09-19 NOTE — PHYSICAL EXAM
[de-identified] : Examination of the [left] hand particularly at the A1 of the middle reveals tenderness with a palpable click.    [de-identified] : [4] views of [bilateral hands and wrists] were reviewed today in my office and were seen by me and discussed with the patient.  These [show findings consistent with bilateral basal joint OA and findings of IP joint OA]

## 2024-09-19 NOTE — HISTORY OF PRESENT ILLNESS
[FreeTextEntry1] : Ziyad returns for follow-up with a chronic greater than 1 year history of worsening left middle finger pain swelling and stiffness.  Recently it has progressed tremendously and has not improved with oral medications including meloxicam.  Prior injection was beneficial.  She would like to repeat this.

## 2024-12-10 ENCOUNTER — APPOINTMENT (OUTPATIENT)
Dept: OTOLARYNGOLOGY | Facility: CLINIC | Age: 69
End: 2024-12-10
Payer: MEDICARE

## 2024-12-10 VITALS — BODY MASS INDEX: 23.32 KG/M2 | WEIGHT: 140 LBS | HEIGHT: 65 IN

## 2024-12-10 DIAGNOSIS — H61.23 IMPACTED CERUMEN, BILATERAL: ICD-10-CM

## 2024-12-10 DIAGNOSIS — H90.3 SENSORINEURAL HEARING LOSS, BILATERAL: ICD-10-CM

## 2024-12-10 PROCEDURE — 92567 TYMPANOMETRY: CPT

## 2024-12-10 PROCEDURE — G0268 REMOVAL OF IMPACTED WAX MD: CPT

## 2024-12-10 PROCEDURE — 99213 OFFICE O/P EST LOW 20 MIN: CPT | Mod: 25

## 2024-12-10 PROCEDURE — 92557 COMPREHENSIVE HEARING TEST: CPT

## 2024-12-19 ENCOUNTER — APPOINTMENT (OUTPATIENT)
Dept: ORTHOPEDIC SURGERY | Facility: CLINIC | Age: 69
End: 2024-12-19
Payer: MEDICARE

## 2024-12-19 VITALS
HEIGHT: 65 IN | WEIGHT: 140 LBS | BODY MASS INDEX: 23.32 KG/M2 | SYSTOLIC BLOOD PRESSURE: 125 MMHG | DIASTOLIC BLOOD PRESSURE: 80 MMHG | HEART RATE: 61 BPM

## 2024-12-19 DIAGNOSIS — M65.90 UNSPECIFIED SYNOVITIS AND TENOSYNOVITIS, UNSPECIFIED SITE: ICD-10-CM

## 2024-12-19 DIAGNOSIS — M25.532 PAIN IN LEFT WRIST: ICD-10-CM

## 2024-12-19 PROCEDURE — 20605 DRAIN/INJ JOINT/BURSA W/O US: CPT | Mod: LT

## 2024-12-19 PROCEDURE — 99214 OFFICE O/P EST MOD 30 MIN: CPT | Mod: 25

## 2025-01-16 ENCOUNTER — APPOINTMENT (OUTPATIENT)
Dept: ORTHOPEDIC SURGERY | Facility: CLINIC | Age: 70
End: 2025-01-16
Payer: MEDICARE

## 2025-01-16 VITALS
BODY MASS INDEX: 23.32 KG/M2 | WEIGHT: 140 LBS | DIASTOLIC BLOOD PRESSURE: 86 MMHG | HEIGHT: 65 IN | SYSTOLIC BLOOD PRESSURE: 157 MMHG

## 2025-01-16 DIAGNOSIS — M25.532 PAIN IN LEFT WRIST: ICD-10-CM

## 2025-01-16 DIAGNOSIS — M65.90 UNSPECIFIED SYNOVITIS AND TENOSYNOVITIS, UNSPECIFIED SITE: ICD-10-CM

## 2025-01-16 PROCEDURE — 20605 DRAIN/INJ JOINT/BURSA W/O US: CPT | Mod: LT

## 2025-01-16 PROCEDURE — 99214 OFFICE O/P EST MOD 30 MIN: CPT | Mod: 25

## 2025-01-31 NOTE — PATIENT PROFILE ADULT - PATIENT REPRESENTATIVE: ( YOU CAN CHOOSE ANY PERSON THAT CAN ASSIST YOU WITH YOUR HEALTH CARE PREFERENCES, DOES NOT HAVE TO BE A SPOUSE, IMMEDIATE FAMILY OR SIGNIFICANT OTHER/PARTNER)
Labs before next visit in 3 mo   Diabetic eye exam 2025    Lantus 28 units daily   Novolog with meals 12 units with larger meals brkfst and dinner   10 with lunch   Take 7 with smaller meals or will go for a walk after.  Ok to skip this dose if will skip a meal .     Stop metformin   Will start pantoprazole twice a day   Will pay attention to symptoms - post nasal  drip    Rosuvastatin      Valsartan /hydrochlorothiazide  Walk 180 min/week   Limit cabrs  Check blood sugar fasting, before meals and before bedtime.   If you have low blood sugar <70, take 15 grams of carb (8 oz juice or regular soda) and recheck in 15 minutes.    Follow up with podiatry and eye doctor annually.   Patients need to wear covered shoes all the time and check feet daily.   Bring your meter/BG log to the next visit.      Target A1c 6.5%  Target BG   BEFORE MEAL 100-130mg/dl  2hrs AFTER MEAL less than 180mg/dl  
yes

## 2025-03-06 ENCOUNTER — APPOINTMENT (OUTPATIENT)
Age: 70
End: 2025-03-06

## 2025-03-06 ENCOUNTER — ASOB RESULT (OUTPATIENT)
Age: 70
End: 2025-03-06

## 2025-03-06 VITALS
BODY MASS INDEX: 23.66 KG/M2 | DIASTOLIC BLOOD PRESSURE: 92 MMHG | HEIGHT: 65 IN | SYSTOLIC BLOOD PRESSURE: 133 MMHG | WEIGHT: 142 LBS | HEART RATE: 62 BPM

## 2025-03-06 DIAGNOSIS — R92.30 DENSE BREASTS, UNSPECIFIED: ICD-10-CM

## 2025-03-06 DIAGNOSIS — Z87.39 PERSONAL HISTORY OF OTHER DISEASES OF THE MUSCULOSKELETAL SYSTEM AND CONNECTIVE TISSUE: ICD-10-CM

## 2025-03-06 DIAGNOSIS — M25.562 PAIN IN LEFT KNEE: ICD-10-CM

## 2025-03-06 DIAGNOSIS — M25.561 PAIN IN RIGHT KNEE: ICD-10-CM

## 2025-03-06 DIAGNOSIS — Z92.89 PERSONAL HISTORY OF OTHER MEDICAL TREATMENT: ICD-10-CM

## 2025-03-06 DIAGNOSIS — H61.21 IMPACTED CERUMEN, RIGHT EAR: ICD-10-CM

## 2025-03-06 DIAGNOSIS — Z13.820 ENCOUNTER FOR SCREENING FOR OSTEOPOROSIS: ICD-10-CM

## 2025-03-06 DIAGNOSIS — M17.12 UNILATERAL PRIMARY OSTEOARTHRITIS, LEFT KNEE: ICD-10-CM

## 2025-03-06 DIAGNOSIS — H93.8X2 OTHER SPECIFIED DISORDERS OF LEFT EAR: ICD-10-CM

## 2025-03-06 DIAGNOSIS — H90.3 SENSORINEURAL HEARING LOSS, BILATERAL: ICD-10-CM

## 2025-03-06 DIAGNOSIS — M19.90 UNSPECIFIED OSTEOARTHRITIS, UNSPECIFIED SITE: ICD-10-CM

## 2025-03-06 DIAGNOSIS — Z96.642 PRESENCE OF LEFT ARTIFICIAL HIP JOINT: ICD-10-CM

## 2025-03-06 DIAGNOSIS — R73.03 PREDIABETES.: ICD-10-CM

## 2025-03-06 DIAGNOSIS — H61.22 IMPACTED CERUMEN, LEFT EAR: ICD-10-CM

## 2025-03-06 DIAGNOSIS — H61.23 IMPACTED CERUMEN, BILATERAL: ICD-10-CM

## 2025-03-06 DIAGNOSIS — M65.30 TRIGGER FINGER, UNSPECIFIED FINGER: ICD-10-CM

## 2025-03-06 DIAGNOSIS — N95.0 POSTMENOPAUSAL BLEEDING: ICD-10-CM

## 2025-03-06 DIAGNOSIS — Z78.0 ENCOUNTER FOR SCREENING FOR OSTEOPOROSIS: ICD-10-CM

## 2025-03-06 DIAGNOSIS — Z86.69 PERSONAL HISTORY OF OTHER DISEASES OF THE NERVOUS SYSTEM AND SENSE ORGANS: ICD-10-CM

## 2025-03-06 DIAGNOSIS — Z12.31 ENCOUNTER FOR SCREENING MAMMOGRAM FOR MALIGNANT NEOPLASM OF BREAST: ICD-10-CM

## 2025-03-06 DIAGNOSIS — Z47.1 AFTERCARE FOLLOWING JOINT REPLACEMENT SURGERY: ICD-10-CM

## 2025-03-06 DIAGNOSIS — M25.532 PAIN IN LEFT WRIST: ICD-10-CM

## 2025-03-06 DIAGNOSIS — Z01.419 ENCOUNTER FOR GYNECOLOGICAL EXAMINATION (GENERAL) (ROUTINE) W/OUT ABNORMAL FINDINGS: ICD-10-CM

## 2025-03-06 DIAGNOSIS — M16.12 UNILATERAL PRIMARY OSTEOARTHRITIS, LEFT HIP: ICD-10-CM

## 2025-03-06 DIAGNOSIS — M79.644 PAIN IN RIGHT FINGER(S): ICD-10-CM

## 2025-03-06 DIAGNOSIS — Z96.642 AFTERCARE FOLLOWING JOINT REPLACEMENT SURGERY: ICD-10-CM

## 2025-03-06 DIAGNOSIS — Z96.649 PRESENCE OF UNSPECIFIED ARTIFICIAL HIP JOINT: ICD-10-CM

## 2025-03-06 DIAGNOSIS — M17.11 UNILATERAL PRIMARY OSTEOARTHRITIS, RIGHT KNEE: ICD-10-CM

## 2025-03-06 DIAGNOSIS — Z12.4 ENCOUNTER FOR SCREENING FOR MALIGNANT NEOPLASM OF CERVIX: ICD-10-CM

## 2025-03-06 DIAGNOSIS — M25.559 PAIN IN UNSPECIFIED HIP: ICD-10-CM

## 2025-03-06 LAB
APPEARANCE: CLEAR
BILIRUBIN URINE: NEGATIVE
BLOOD URINE: NEGATIVE
COLOR: YELLOW
GLUCOSE QUALITATIVE U: NEGATIVE
KETONES URINE: NEGATIVE
LEUKOCYTE ESTERASE URINE: NEGATIVE
NITRITE URINE: NEGATIVE
PH URINE: 7.5
PROTEIN URINE: NEGATIVE
SPECIFIC GRAVITY URINE: 1.02
UROBILINOGEN URINE: 0.2 (ref 0.2–?)

## 2025-03-06 PROCEDURE — 76830 TRANSVAGINAL US NON-OB: CPT

## 2025-03-06 PROCEDURE — G0101: CPT

## 2025-03-06 PROCEDURE — 99204 OFFICE O/P NEW MOD 45 MIN: CPT | Mod: 25

## 2025-03-10 LAB
A VAGINAE DNA VAG QL NAA+PROBE: NORMAL
BVAB2 DNA VAG QL NAA+PROBE: NORMAL
C KRUSEI DNA VAG QL NAA+PROBE: NEGATIVE
C TRACH RRNA SPEC QL NAA+PROBE: NEGATIVE
CANDIDA DNA VAG QL NAA+PROBE: NEGATIVE
HPV HIGH+LOW RISK DNA PNL CVX: NOT DETECTED
MEGA1 DNA VAG QL NAA+PROBE: NORMAL
N GONORRHOEA RRNA SPEC QL NAA+PROBE: NEGATIVE
T VAGINALIS RRNA SPEC QL NAA+PROBE: NEGATIVE

## 2025-03-11 LAB — CYTOLOGY CVX/VAG DOC THIN PREP: ABNORMAL

## 2025-03-20 ENCOUNTER — APPOINTMENT (OUTPATIENT)
Dept: ORTHOPEDIC SURGERY | Facility: CLINIC | Age: 70
End: 2025-03-20
Payer: MEDICARE

## 2025-03-20 VITALS
BODY MASS INDEX: 23.66 KG/M2 | DIASTOLIC BLOOD PRESSURE: 84 MMHG | SYSTOLIC BLOOD PRESSURE: 154 MMHG | WEIGHT: 142 LBS | HEIGHT: 65 IN

## 2025-03-20 DIAGNOSIS — M65.939 UNSPECIFIED SYNOVITIS AND TENOSYNOVITIS, UNSPECIFIED FOREARM: ICD-10-CM

## 2025-03-20 DIAGNOSIS — M25.532 PAIN IN LEFT WRIST: ICD-10-CM

## 2025-03-20 PROCEDURE — 99214 OFFICE O/P EST MOD 30 MIN: CPT | Mod: 25

## 2025-03-20 PROCEDURE — 20605 DRAIN/INJ JOINT/BURSA W/O US: CPT | Mod: LT

## 2025-03-20 RX ORDER — MELOXICAM 15 MG/1
15 TABLET ORAL DAILY
Qty: 15 | Refills: 1 | Status: ACTIVE | COMMUNITY
Start: 2025-03-20 | End: 1900-01-01

## 2025-04-12 ENCOUNTER — OFFICE (OUTPATIENT)
Dept: URBAN - METROPOLITAN AREA CLINIC 115 | Facility: CLINIC | Age: 70
Setting detail: OPHTHALMOLOGY
End: 2025-04-12
Payer: MEDICARE

## 2025-04-12 DIAGNOSIS — H40.033: ICD-10-CM

## 2025-04-12 DIAGNOSIS — H35.033: ICD-10-CM

## 2025-04-12 DIAGNOSIS — Z96.1: ICD-10-CM

## 2025-04-12 DIAGNOSIS — H30.143: ICD-10-CM

## 2025-04-12 DIAGNOSIS — H10.45: ICD-10-CM

## 2025-04-12 PROCEDURE — 92083 EXTENDED VISUAL FIELD XM: CPT | Performed by: OPHTHALMOLOGY

## 2025-04-12 PROCEDURE — 92014 COMPRE OPH EXAM EST PT 1/>: CPT | Performed by: OPHTHALMOLOGY

## 2025-04-12 ASSESSMENT — REFRACTION_MANIFEST
OD_VA1: 20/20
OS_VA1: 20/20
OD_CYLINDER: -0.50
OS_ADD: +2.50
OS_AXIS: 110
OU_VA: 20/20
OD_ADD: +2.50
OD_CYLINDER: -0.50
OU_VA: 20/20
OS_SPHERE: +0.50
OD_SPHERE: +0.50
OD_ADD: +2.50
OS_CYLINDER: -1.50
OD_AXIS: 085
OD_AXIS: 070
OD_VA1: 20/20
OS_VA1: 20/20
OS_ADD: +2.50
OS_CYLINDER: -1.00
OS_SPHERE: +0.50
OD_SPHERE: +0.25
OS_AXIS: 120

## 2025-04-12 ASSESSMENT — REFRACTION_CURRENTRX
OS_CYLINDER: -1.25
OD_OVR_VA: 20/
OS_CYLINDER: -1.25
OS_CYLINDER: -0.75
OD_AXIS: 085
OS_ADD: +2.50
OS_ADD: +2.00
OS_VPRISM_DIRECTION: PROGS
OS_OVR_VA: 20/
OD_AXIS: 090
OD_OVR_VA: 20/
OS_SPHERE: +0.50
OS_AXIS: 105
OD_CYLINDER: -0.50
OS_AXIS: 102
OD_VPRISM_DIRECTION: PROGS
OD_SPHERE: +0.25
OD_ADD: +2.50
OS_OVR_VA: 20/
OS_AXIS: 095
OD_AXIS: 085
OD_ADD: +2.50
OS_SPHERE: +0.50
OD_CYLINDER: -0.50
OS_VPRISM_DIRECTION: PROGS
OD_ADD: +2.25
OS_OVR_VA: 20/
OD_VPRISM_DIRECTION: PROGS
OD_OVR_VA: 20/
OS_SPHERE: +0.50
OD_CYLINDER: -0.50
OD_SPHERE: +0.25
OD_VPRISM_DIRECTION: PROGS
OS_ADD: +2.50
OD_SPHERE: +0.25
OS_VPRISM_DIRECTION: PROGS

## 2025-04-12 ASSESSMENT — REFRACTION_AUTOREFRACTION
OD_AXIS: 047
OS_CYLINDER: -2.00
OD_CYLINDER: -0.50
OD_SPHERE: +0.75
OS_AXIS: 103
OS_SPHERE: +1.25

## 2025-04-12 ASSESSMENT — TONOMETRY
OD_IOP_MMHG: 13
OS_IOP_MMHG: 11

## 2025-04-12 ASSESSMENT — CONFRONTATIONAL VISUAL FIELD TEST (CVF)
OD_FINDINGS: FULL
OS_FINDINGS: FULL

## 2025-04-12 ASSESSMENT — VISUAL ACUITY
OS_BCVA: 20/20
OD_BCVA: 20/20

## 2025-05-05 NOTE — PHYSICAL THERAPY INITIAL EVALUATION ADULT - GENERAL OBSERVATIONS, REHAB EVAL
Pt received lying in bed in PACU, (+) TEDs bilateral LE's, (+) SCDs bilateral LE's, (+) IV left UE, (+) Cardiac monitor, Pt dizzy, but motivated to participate with PT. Name band;

## 2025-05-12 ENCOUNTER — OFFICE (OUTPATIENT)
Dept: URBAN - METROPOLITAN AREA CLINIC 115 | Facility: CLINIC | Age: 70
Setting detail: OPHTHALMOLOGY
End: 2025-05-12
Payer: MEDICARE

## 2025-05-12 DIAGNOSIS — H11.31: ICD-10-CM

## 2025-05-12 DIAGNOSIS — Z96.1: ICD-10-CM

## 2025-05-12 DIAGNOSIS — H10.45: ICD-10-CM

## 2025-05-12 PROCEDURE — 92012 INTRM OPH EXAM EST PATIENT: CPT | Performed by: OPHTHALMOLOGY

## 2025-05-12 ASSESSMENT — REFRACTION_CURRENTRX
OS_CYLINDER: -1.25
OS_ADD: +2.50
OS_VPRISM_DIRECTION: PROGS
OD_OVR_VA: 20/
OD_AXIS: 090
OS_CYLINDER: -1.25
OD_AXIS: 085
OD_ADD: +2.50
OS_VPRISM_DIRECTION: PROGS
OD_ADD: +2.25
OD_SPHERE: +0.25
OD_OVR_VA: 20/
OS_OVR_VA: 20/
OD_AXIS: 085
OS_ADD: +2.50
OS_CYLINDER: -0.75
OS_VPRISM_DIRECTION: PROGS
OS_AXIS: 095
OS_SPHERE: +0.50
OD_OVR_VA: 20/
OD_CYLINDER: -0.50
OS_OVR_VA: 20/
OD_VPRISM_DIRECTION: PROGS
OD_VPRISM_DIRECTION: PROGS
OD_CYLINDER: -0.50
OS_OVR_VA: 20/
OS_AXIS: 102
OS_ADD: +2.00
OD_VPRISM_DIRECTION: PROGS
OD_SPHERE: +0.25
OD_CYLINDER: -0.50
OS_AXIS: 105
OS_SPHERE: +0.50
OD_ADD: +2.50
OD_SPHERE: +0.25
OS_SPHERE: +0.50

## 2025-05-12 ASSESSMENT — TONOMETRY
OD_IOP_MMHG: 10
OS_IOP_MMHG: 13

## 2025-05-12 ASSESSMENT — REFRACTION_MANIFEST
OD_AXIS: 070
OD_VA1: 20/20
OD_SPHERE: +0.50
OS_AXIS: 120
OS_AXIS: 110
OD_ADD: +2.50
OU_VA: 20/20
OD_SPHERE: +0.25
OS_SPHERE: +0.50
OS_SPHERE: +0.50
OS_VA1: 20/20
OS_CYLINDER: -1.50
OS_ADD: +2.50
OD_CYLINDER: -0.50
OD_AXIS: 085
OS_VA1: 20/20
OU_VA: 20/20
OS_ADD: +2.50
OD_ADD: +2.50
OS_CYLINDER: -1.00
OD_VA1: 20/20
OD_CYLINDER: -0.50

## 2025-05-12 ASSESSMENT — VISUAL ACUITY
OD_BCVA: 20/20
OS_BCVA: 20/40

## 2025-05-12 ASSESSMENT — REFRACTION_AUTOREFRACTION
OS_CYLINDER: -2.00
OD_CYLINDER: -0.75
OS_AXIS: 116
OD_SPHERE: +0.75
OD_AXIS: 057
OS_SPHERE: +1.25

## 2025-05-12 ASSESSMENT — CONFRONTATIONAL VISUAL FIELD TEST (CVF)
OS_FINDINGS: FULL
OD_FINDINGS: FULL

## 2025-06-24 ENCOUNTER — APPOINTMENT (OUTPATIENT)
Dept: OTOLARYNGOLOGY | Facility: CLINIC | Age: 70
End: 2025-06-24
Payer: MEDICARE

## 2025-06-24 VITALS — BODY MASS INDEX: 23.32 KG/M2 | HEIGHT: 65 IN | WEIGHT: 140 LBS

## 2025-06-24 PROCEDURE — 99213 OFFICE O/P EST LOW 20 MIN: CPT | Mod: 25

## 2025-06-24 PROCEDURE — 69210 REMOVE IMPACTED EAR WAX UNI: CPT

## 2025-06-26 ENCOUNTER — APPOINTMENT (OUTPATIENT)
Dept: ORTHOPEDIC SURGERY | Facility: CLINIC | Age: 70
End: 2025-06-26

## 2025-06-26 VITALS
BODY MASS INDEX: 23.32 KG/M2 | DIASTOLIC BLOOD PRESSURE: 85 MMHG | SYSTOLIC BLOOD PRESSURE: 139 MMHG | WEIGHT: 140 LBS | HEIGHT: 65 IN

## 2025-06-26 PROCEDURE — 20605 DRAIN/INJ JOINT/BURSA W/O US: CPT | Mod: LT,59

## 2025-06-26 PROCEDURE — 99214 OFFICE O/P EST MOD 30 MIN: CPT | Mod: 25

## 2025-06-26 PROCEDURE — 20550 NJX 1 TENDON SHEATH/LIGAMENT: CPT | Mod: 59,LT

## 2025-06-26 RX ORDER — MELOXICAM 15 MG/1
15 TABLET ORAL DAILY
Qty: 15 | Refills: 1 | Status: ACTIVE | COMMUNITY
Start: 2025-06-26 | End: 1900-01-01

## 2025-07-21 NOTE — H&P PST ADULT - NSANTHOBSERVEDRD_ENT_A_CORE
HUB MAY RELAY MESSAGE AND RESCHEDULE:     Linnea is out of office on 12/16/25, pt will need to be rescheduled for another date.   
No